# Patient Record
Sex: FEMALE | Race: WHITE | NOT HISPANIC OR LATINO | Employment: FULL TIME | URBAN - METROPOLITAN AREA
[De-identification: names, ages, dates, MRNs, and addresses within clinical notes are randomized per-mention and may not be internally consistent; named-entity substitution may affect disease eponyms.]

---

## 2018-10-26 ENCOUNTER — OFFICE VISIT (OUTPATIENT)
Dept: URGENT CARE | Facility: PHYSICIAN GROUP | Age: 48
End: 2018-10-26
Payer: COMMERCIAL

## 2018-10-26 VITALS
HEIGHT: 62 IN | RESPIRATION RATE: 18 BRPM | BODY MASS INDEX: 29.81 KG/M2 | WEIGHT: 162 LBS | TEMPERATURE: 97.6 F | DIASTOLIC BLOOD PRESSURE: 69 MMHG | SYSTOLIC BLOOD PRESSURE: 99 MMHG | OXYGEN SATURATION: 97 % | HEART RATE: 87 BPM

## 2018-10-26 DIAGNOSIS — R30.0 DYSURIA: ICD-10-CM

## 2018-10-26 DIAGNOSIS — R50.9 FEVER, UNSPECIFIED FEVER CAUSE: ICD-10-CM

## 2018-10-26 DIAGNOSIS — R50.9 CHILLS WITH FEVER: ICD-10-CM

## 2018-10-26 DIAGNOSIS — R39.9 UTI SYMPTOMS: ICD-10-CM

## 2018-10-26 LAB
APPEARANCE UR: CLEAR
BILIRUB UR STRIP-MCNC: NORMAL MG/DL
COLOR UR AUTO: NORMAL
GLUCOSE UR STRIP.AUTO-MCNC: NORMAL MG/DL
KETONES UR STRIP.AUTO-MCNC: 15 MG/DL
LEUKOCYTE ESTERASE UR QL STRIP.AUTO: NORMAL
NITRITE UR QL STRIP.AUTO: NORMAL
PH UR STRIP.AUTO: 6 [PH] (ref 5–8)
PROT UR QL STRIP: NORMAL MG/DL
RBC UR QL AUTO: NORMAL
SP GR UR STRIP.AUTO: 1.02
UROBILINOGEN UR STRIP-MCNC: 0.2 MG/DL

## 2018-10-26 PROCEDURE — 99204 OFFICE O/P NEW MOD 45 MIN: CPT | Performed by: NURSE PRACTITIONER

## 2018-10-26 PROCEDURE — 81002 URINALYSIS NONAUTO W/O SCOPE: CPT | Performed by: NURSE PRACTITIONER

## 2018-10-26 RX ORDER — TOPIRAMATE 50 MG/1
50 TABLET, FILM COATED ORAL 2 TIMES DAILY
COMMUNITY

## 2018-10-26 ASSESSMENT — ENCOUNTER SYMPTOMS
MYALGIAS: 1
BACK PAIN: 1
HEADACHES: 0
VOMITING: 1
DIZZINESS: 0
FEVER: 0
ORTHOPNEA: 0
FLANK PAIN: 0
WEAKNESS: 0
CONSTIPATION: 0
NAUSEA: 1
PALPITATIONS: 0
ABDOMINAL PAIN: 1
DIARRHEA: 0
CHILLS: 1

## 2018-10-26 NOTE — PROGRESS NOTES
"Subjective:      Zeenat Hernandez is a 48 y.o. female who presents with Pain (patient has lower back pian and abdominal pain. patient thinks she has kidney infection.)            HPI  C/o low pelvic pain, urgency/frequency, mid back pain/kidney region. Chills last night. Vomiting last night 3x. Denies hematuria or kidney stones. Ureter stent placed last year x 2 months but has been removed. Admits to have taken \"old\" prescription of Macrobid 2x, last night and today.     PMH:  has no past medical history on file.  MEDS:   Current Outpatient Prescriptions:   •  topiramate (TOPAMAX) 100 MG Tab, Take 50 mg by mouth 2 times a day., Disp: , Rfl:   ALLERGIES: No Known Allergies  SURGHX: No past surgical history on file.  SOCHX:    FH: Family history was reviewed, no pertinent findings to report    Review of Systems   Constitutional: Positive for chills. Negative for fever and malaise/fatigue.   Cardiovascular: Negative for chest pain, palpitations and orthopnea.   Gastrointestinal: Positive for abdominal pain, nausea and vomiting. Negative for constipation and diarrhea.   Genitourinary: Positive for dysuria, frequency and urgency. Negative for flank pain and hematuria.   Musculoskeletal: Positive for back pain and myalgias.   Skin: Negative for itching and rash.   Neurological: Negative for dizziness, weakness and headaches.   All other systems reviewed and are negative.         Objective:     BP (!) 99/69 (BP Location: Right arm, Patient Position: Sitting, BP Cuff Size: Adult)   Pulse 87   Temp 36.4 °C (97.6 °F) (Temporal)   Resp 18   Ht 1.575 m (5' 2\")   Wt 73.5 kg (162 lb)   SpO2 97%   BMI 29.63 kg/m²      Physical Exam   Constitutional: She is oriented to person, place, and time. She appears well-developed and well-nourished. She is active and cooperative.  Non-toxic appearance. She does not have a sickly appearance. She does not appear ill. No distress.   HENT:   Head: Normocephalic.   Eyes: Pupils are equal, " round, and reactive to light. Conjunctivae and EOM are normal.   Neck: Normal range of motion. Neck supple.   Cardiovascular: Normal rate and regular rhythm.    Pulmonary/Chest: Effort normal and breath sounds normal.   Abdominal: Soft. Bowel sounds are normal. She exhibits no distension. There is no tenderness. There is CVA tenderness. There is no rigidity, no rebound and no guarding.   Musculoskeletal: Normal range of motion.        Lumbar back: She exhibits tenderness.        Back:    Neurological: She is alert and oriented to person, place, and time.   Skin: Skin is warm and dry. She is not diaphoretic.   Vitals reviewed.              Assessment/Plan:     1. Dysuria    - POCT Urinalysis    2. Fever, unspecified fever cause      3. Chills with fever      4. UTI symptoms    - cefTRIAXone (ROCEPHIN) 1 g, lidocaine (XYLOCAINE) 1 % 3.6 mL for IM use; 1 g by Intramuscular route Once.    May finish Macrobid at this time  Increase water intake  Urinate more frequently and empty bladder completely  Practice good toileting hygiene after bowel movements and sexual intercourse, refrain from sexual intercourse until infection cleared  Monitor for increased back/flank pain, difficulty urinating, blood in urine, fever-need re-evaluation  ER precautions discussed

## 2020-09-30 ENCOUNTER — OFFICE VISIT (OUTPATIENT)
Dept: URGENT CARE | Facility: PHYSICIAN GROUP | Age: 50
End: 2020-09-30
Payer: COMMERCIAL

## 2020-09-30 ENCOUNTER — HOSPITAL ENCOUNTER (OUTPATIENT)
Facility: MEDICAL CENTER | Age: 50
End: 2020-09-30
Attending: PHYSICIAN ASSISTANT
Payer: COMMERCIAL

## 2020-09-30 VITALS
SYSTOLIC BLOOD PRESSURE: 110 MMHG | BODY MASS INDEX: 29.63 KG/M2 | OXYGEN SATURATION: 96 % | HEART RATE: 86 BPM | HEIGHT: 62 IN | RESPIRATION RATE: 18 BRPM | TEMPERATURE: 97.6 F | DIASTOLIC BLOOD PRESSURE: 70 MMHG

## 2020-09-30 DIAGNOSIS — R10.2 PELVIC PAIN: ICD-10-CM

## 2020-09-30 LAB
APPEARANCE UR: CLEAR
BILIRUB UR STRIP-MCNC: NORMAL MG/DL
COLOR UR AUTO: NORMAL
GLUCOSE UR STRIP.AUTO-MCNC: NORMAL MG/DL
KETONES UR STRIP.AUTO-MCNC: NORMAL MG/DL
LEUKOCYTE ESTERASE UR QL STRIP.AUTO: NORMAL
NITRITE UR QL STRIP.AUTO: NORMAL
PH UR STRIP.AUTO: 5.5 [PH] (ref 5–8)
PROT UR QL STRIP: NORMAL MG/DL
RBC UR QL AUTO: NORMAL
SP GR UR STRIP.AUTO: 1.03
UROBILINOGEN UR STRIP-MCNC: 0.2 MG/DL

## 2020-09-30 PROCEDURE — 81002 URINALYSIS NONAUTO W/O SCOPE: CPT | Performed by: PHYSICIAN ASSISTANT

## 2020-09-30 PROCEDURE — 87086 URINE CULTURE/COLONY COUNT: CPT

## 2020-09-30 PROCEDURE — 99214 OFFICE O/P EST MOD 30 MIN: CPT | Performed by: PHYSICIAN ASSISTANT

## 2020-09-30 RX ORDER — MELOXICAM 15 MG/1
TABLET ORAL DAILY
COMMUNITY
Start: 2020-09-01 | End: 2022-06-17

## 2020-09-30 RX ORDER — METHYLPREDNISOLONE 4 MG/1
TABLET ORAL
COMMUNITY
Start: 2020-08-18 | End: 2022-06-17

## 2020-09-30 ASSESSMENT — ENCOUNTER SYMPTOMS
EYE PAIN: 0
MYALGIAS: 0
SHORTNESS OF BREATH: 0
SORE THROAT: 0
FEVER: 0
ABDOMINAL PAIN: 1
NAUSEA: 0
VOMITING: 0
CHILLS: 0
COUGH: 0
BLOOD IN STOOL: 0
CONSTIPATION: 0
HEADACHES: 0
DIARRHEA: 0

## 2020-09-30 NOTE — PROGRESS NOTES
Subjective:   Zeenat Hernandez is a 50 y.o. female who presents for Abdominal Pain (when pushing on it it hurts or when reaching down it stabbing pain x 1 week )      This is a 50-year-old female who presents complaining of bilateral pelvic pain left greater than right which she first noticed around 1 week ago directly after she reached down aggressively to pull up her pants.  She is never experienced this problem before and reports that the pain is somewhat cramping and intermittent.  Happens mostly when she is changing positions but occasionally it will happen when it is not triggered by movement.  She has had no bladder or bowel issues and denies any dysuria, frequency or urgency.  She denies any vaginal bleeding or vaginal discharge.  She has history of a hysterectomy and unilateral oophorectomy but does not remember which side she still has an ovary.  Also of concern is that she has gained around 50 pounds over the last 3 months she reports.  She is been struggling to get into a primary care provider.  She is not noticed any fevers or chills, body aches or malaise.      Review of Systems   Constitutional: Negative for chills, fever and malaise/fatigue.   HENT: Negative for congestion, ear pain and sore throat.    Eyes: Negative for pain.   Respiratory: Negative for cough and shortness of breath.    Cardiovascular: Negative for chest pain.   Gastrointestinal: Positive for abdominal pain. Negative for blood in stool, constipation, diarrhea, nausea and vomiting.   Genitourinary: Negative for dysuria.   Musculoskeletal: Negative for myalgias.   Skin: Negative for rash.   Neurological: Negative for headaches.       Medications:    • meloxicam  • methylPREDNISolone Tbpk  • topiramate Tabs    Allergies: Patient has no known allergies.    Problem List: Zeenat Hernandez does not have a problem list on file.    Surgical History:  No past surgical history on file.    Past Social Hx: Zeenat Hernandez  reports that she has never  "smoked. She has never used smokeless tobacco.     Past Family Hx:  Zeenat Hernandez family history is not on file.     Problem list, medications, and allergies reviewed by myself today in Epic.     Objective:     /70 (BP Location: Right arm, Patient Position: Sitting, BP Cuff Size: Adult long)   Pulse 86   Temp 36.4 °C (97.6 °F) (Tympanic)   Resp 18   Ht 1.575 m (5' 2\")   SpO2 96%   Breastfeeding No   BMI 29.63 kg/m²     Physical Exam  Vitals signs reviewed.   Constitutional:       Appearance: Normal appearance. She is not ill-appearing or toxic-appearing.   HENT:      Head: Normocephalic and atraumatic.      Right Ear: External ear normal.      Left Ear: External ear normal.      Nose: Nose normal.      Mouth/Throat:      Mouth: Mucous membranes are moist.   Eyes:      Conjunctiva/sclera: Conjunctivae normal.   Cardiovascular:      Rate and Rhythm: Normal rate.   Pulmonary:      Effort: Pulmonary effort is normal.   Abdominal:      Comments: Nontender abdomen to light and deep palpation.  I can probe deeply at the area indicated at the left suprapubic area and there is no abdominal wall defect.  Rebound or guarding.  Significant adiposity.   Skin:     General: Skin is warm and dry.      Capillary Refill: Capillary refill takes less than 2 seconds.   Neurological:      Mental Status: She is alert and oriented to person, place, and time.         Lab Results/POC Test Results   Results for orders placed or performed in visit on 09/30/20   POCT Urinalysis   Result Value Ref Range    POC Color DARK YELLOW Negative    POC Appearance CLEAR Negative    POC Leukocyte Esterase NEG Negative    POC Nitrites NEG Negative    POC Urobiligen 0.2 Negative (0.2) mg/dL    POC Protein NEG Negative mg/dL    POC Urine PH 5.5 5.0 - 8.0    POC Blood NEG Negative    POC Specific Gravity 1.030 <1.005 - >1.030    POC Ketones NEG Negative mg/dL    POC Bilirubin NEG Negative mg/dL    POC Glucose NEG Negative mg/dL     "       Assessment/Plan:     Diagnosis and associated orders:     1. Pelvic pain  POCT Urinalysis    URINE CULTURE(NEW)    REFERRAL TO FOLLOW-UP WITH PRIMARY CARE    US-PELVIC COMPLETE (TRANSABDOMINAL/TRANSVAGINAL) (COMBO)      Comments/MDM:     • Patient's urinalysis does not support a urinary tract infection as well as her symptomatology.  I discussed with her on think it is wise to do empiric treatment.  I am happy to send it out for culture and follow-up with her via my chart or phone call.  The area she indicates is more suprapubic and with her unknown unilateral oophorectomy and not sure if she has an ovary there or if this is an underlying bowel pathology.  She has a stated history of celiac disease but has an inexplicable 50 pound weight gain in 3 months which is concerning for malignancy or other pathology.-Referral to primary care.  • To evaluate for suprapubic pain I put in an order for transvaginal transpelvic ultrasound but due to scheduling issues she is unable to make this.  I provided her with the order and the scheduling information and states that she can get it done anytime in the next 5 days before the order expires and I will follow-up with her.  I suggested ER precautions including any bladder or bowel abnormalities, worsening pain, or any new concerning symptoms that need evaluation.  She demonstrated verbal understanding and was appreciative of care.  There is no red flag signs that this is a life-threatening etiology right now.  She has stable vitals, nontender abdomen and reports that today her pain is actually much better than yesterday           Differential diagnosis, natural history, supportive care, and indications for immediate follow-up discussed.    Advised the patient to follow-up with the primary care physician for recheck, reevaluation, and consideration of further management.    Please note that this dictation was created using voice recognition software. I have made a reasonable  attempt to correct obvious errors, but I expect that there are errors of grammar and possibly content that I did not discover before finalizing the note.    This note was electronically signed by Philip Muñoz PA-C

## 2020-10-03 LAB
BACTERIA UR CULT: NORMAL
SIGNIFICANT IND 70042: NORMAL
SITE SITE: NORMAL
SOURCE SOURCE: NORMAL

## 2022-06-17 ENCOUNTER — PRE-ADMISSION TESTING (OUTPATIENT)
Dept: ADMISSIONS | Facility: MEDICAL CENTER | Age: 52
DRG: 655 | End: 2022-06-17
Attending: UROLOGY
Payer: COMMERCIAL

## 2022-06-21 ENCOUNTER — ANESTHESIA (OUTPATIENT)
Dept: SURGERY | Facility: MEDICAL CENTER | Age: 52
DRG: 655 | End: 2022-06-21
Payer: COMMERCIAL

## 2022-06-21 ENCOUNTER — HOSPITAL ENCOUNTER (INPATIENT)
Facility: MEDICAL CENTER | Age: 52
LOS: 1 days | DRG: 655 | End: 2022-06-22
Attending: UROLOGY | Admitting: UROLOGY
Payer: COMMERCIAL

## 2022-06-21 ENCOUNTER — ANESTHESIA EVENT (OUTPATIENT)
Dept: SURGERY | Facility: MEDICAL CENTER | Age: 52
DRG: 655 | End: 2022-06-21
Payer: COMMERCIAL

## 2022-06-21 DIAGNOSIS — N13.5 URETERAL STRICTURE: ICD-10-CM

## 2022-06-21 DIAGNOSIS — N13.5 STRICTURE OR KINKING OF URETER: Primary | ICD-10-CM

## 2022-06-21 PROCEDURE — 0T9640Z DRAINAGE OF RIGHT URETER WITH DRAINAGE DEVICE, PERCUTANEOUS ENDOSCOPIC APPROACH: ICD-10-PCS | Performed by: UROLOGY

## 2022-06-21 PROCEDURE — 0TSB4ZZ REPOSITION BLADDER, PERCUTANEOUS ENDOSCOPIC APPROACH: ICD-10-PCS | Performed by: UROLOGY

## 2022-06-21 PROCEDURE — 88305 TISSUE EXAM BY PATHOLOGIST: CPT

## 2022-06-21 PROCEDURE — 700101 HCHG RX REV CODE 250: Performed by: ANESTHESIOLOGY

## 2022-06-21 PROCEDURE — 8E0W4CZ ROBOTIC ASSISTED PROCEDURE OF TRUNK REGION, PERCUTANEOUS ENDOSCOPIC APPROACH: ICD-10-PCS | Performed by: UROLOGY

## 2022-06-21 PROCEDURE — 700104 HCHG RX REV CODE 254: Performed by: ANESTHESIOLOGY

## 2022-06-21 PROCEDURE — 160009 HCHG ANES TIME/MIN: Performed by: UROLOGY

## 2022-06-21 PROCEDURE — 0TS74ZZ REPOSITION LEFT URETER, PERCUTANEOUS ENDOSCOPIC APPROACH: ICD-10-PCS | Performed by: UROLOGY

## 2022-06-21 PROCEDURE — C2617 STENT, NON-COR, TEM W/O DEL: HCPCS | Performed by: UROLOGY

## 2022-06-21 PROCEDURE — 700105 HCHG RX REV CODE 258: Performed by: UROLOGY

## 2022-06-21 PROCEDURE — 770001 HCHG ROOM/CARE - MED/SURG/GYN PRIV*

## 2022-06-21 PROCEDURE — 160035 HCHG PACU - 1ST 60 MINS PHASE I: Performed by: UROLOGY

## 2022-06-21 PROCEDURE — 502714 HCHG ROBOTIC SURGERY SERVICES: Performed by: UROLOGY

## 2022-06-21 PROCEDURE — 0T778DZ DILATION OF LEFT URETER WITH INTRALUMINAL DEVICE, VIA NATURAL OR ARTIFICIAL OPENING ENDOSCOPIC: ICD-10-PCS | Performed by: UROLOGY

## 2022-06-21 PROCEDURE — 700111 HCHG RX REV CODE 636 W/ 250 OVERRIDE (IP): Performed by: UROLOGY

## 2022-06-21 PROCEDURE — 700102 HCHG RX REV CODE 250 W/ 637 OVERRIDE(OP): Performed by: ANESTHESIOLOGY

## 2022-06-21 PROCEDURE — 700111 HCHG RX REV CODE 636 W/ 250 OVERRIDE (IP): Performed by: ANESTHESIOLOGY

## 2022-06-21 PROCEDURE — 160002 HCHG RECOVERY MINUTES (STAT): Performed by: UROLOGY

## 2022-06-21 PROCEDURE — 160042 HCHG SURGERY MINUTES - EA ADDL 1 MIN LEVEL 5: Performed by: UROLOGY

## 2022-06-21 PROCEDURE — 700101 HCHG RX REV CODE 250: Performed by: UROLOGY

## 2022-06-21 PROCEDURE — 700102 HCHG RX REV CODE 250 W/ 637 OVERRIDE(OP): Performed by: UROLOGY

## 2022-06-21 PROCEDURE — RXMED WILLOW AMBULATORY MEDICATION CHARGE: Performed by: UROLOGY

## 2022-06-21 PROCEDURE — 00862 ANES XTRPRTL LWR ABD RNL PX: CPT | Performed by: ANESTHESIOLOGY

## 2022-06-21 PROCEDURE — 160048 HCHG OR STATISTICAL LEVEL 1-5: Performed by: UROLOGY

## 2022-06-21 PROCEDURE — A9270 NON-COVERED ITEM OR SERVICE: HCPCS | Performed by: UROLOGY

## 2022-06-21 PROCEDURE — 110371 HCHG SHELL REV 272: Performed by: UROLOGY

## 2022-06-21 PROCEDURE — A9270 NON-COVERED ITEM OR SERVICE: HCPCS | Performed by: ANESTHESIOLOGY

## 2022-06-21 PROCEDURE — C1769 GUIDE WIRE: HCPCS | Performed by: UROLOGY

## 2022-06-21 PROCEDURE — 0T9B70Z DRAINAGE OF BLADDER WITH DRAINAGE DEVICE, VIA NATURAL OR ARTIFICIAL OPENING: ICD-10-PCS | Performed by: UROLOGY

## 2022-06-21 PROCEDURE — 0TP5X0Z REMOVAL OF DRAINAGE DEVICE FROM KIDNEY, EXTERNAL APPROACH: ICD-10-PCS | Performed by: UROLOGY

## 2022-06-21 PROCEDURE — 160031 HCHG SURGERY MINUTES - 1ST 30 MINS LEVEL 5: Performed by: UROLOGY

## 2022-06-21 DEVICE — STENT UROLOGICAL POLARIS 6X26  ULTRA: Type: IMPLANTABLE DEVICE | Site: URETER | Status: FUNCTIONAL

## 2022-06-21 RX ORDER — ONDANSETRON 2 MG/ML
4 INJECTION INTRAMUSCULAR; INTRAVENOUS EVERY 4 HOURS PRN
Status: DISCONTINUED | OUTPATIENT
Start: 2022-06-21 | End: 2022-06-22 | Stop reason: HOSPADM

## 2022-06-21 RX ORDER — INDOCYANINE GREEN AND WATER 25 MG
KIT INJECTION PRN
Status: DISCONTINUED | OUTPATIENT
Start: 2022-06-21 | End: 2022-06-21 | Stop reason: SURG

## 2022-06-21 RX ORDER — FLUCONAZOLE 2 MG/ML
400 INJECTION, SOLUTION INTRAVENOUS ONCE
Status: COMPLETED | OUTPATIENT
Start: 2022-06-21 | End: 2022-06-21

## 2022-06-21 RX ORDER — SCOLOPAMINE TRANSDERMAL SYSTEM 1 MG/1
1 PATCH, EXTENDED RELEASE TRANSDERMAL
Status: DISCONTINUED | OUTPATIENT
Start: 2022-06-21 | End: 2022-06-22 | Stop reason: HOSPADM

## 2022-06-21 RX ORDER — OXYCODONE HYDROCHLORIDE 5 MG/1
5 TABLET ORAL EVERY 4 HOURS PRN
Qty: 15 TABLET | Refills: 0 | Status: SHIPPED | OUTPATIENT
Start: 2022-06-21 | End: 2022-06-27

## 2022-06-21 RX ORDER — HALOPERIDOL 5 MG/ML
1 INJECTION INTRAMUSCULAR
Status: DISCONTINUED | OUTPATIENT
Start: 2022-06-21 | End: 2022-06-21 | Stop reason: HOSPADM

## 2022-06-21 RX ORDER — ATROPA BELLADONNA AND OPIUM 16.2; 6 MG/1; MG/1
1 SUPPOSITORY RECTAL
Status: COMPLETED | OUTPATIENT
Start: 2022-06-21 | End: 2022-06-21

## 2022-06-21 RX ORDER — METOPROLOL TARTRATE 1 MG/ML
1 INJECTION, SOLUTION INTRAVENOUS
Status: DISCONTINUED | OUTPATIENT
Start: 2022-06-21 | End: 2022-06-21 | Stop reason: HOSPADM

## 2022-06-21 RX ORDER — KETOROLAC TROMETHAMINE 30 MG/ML
15 INJECTION, SOLUTION INTRAMUSCULAR; INTRAVENOUS EVERY 6 HOURS
Status: DISCONTINUED | OUTPATIENT
Start: 2022-06-21 | End: 2022-06-22 | Stop reason: HOSPADM

## 2022-06-21 RX ORDER — IBUPROFEN 800 MG/1
800 TABLET ORAL 3 TIMES DAILY PRN
Status: DISCONTINUED | OUTPATIENT
Start: 2022-06-24 | End: 2022-06-22 | Stop reason: HOSPADM

## 2022-06-21 RX ORDER — SODIUM CHLORIDE, SODIUM LACTATE, POTASSIUM CHLORIDE, CALCIUM CHLORIDE 600; 310; 30; 20 MG/100ML; MG/100ML; MG/100ML; MG/100ML
INJECTION, SOLUTION INTRAVENOUS CONTINUOUS
Status: ACTIVE | OUTPATIENT
Start: 2022-06-21 | End: 2022-06-21

## 2022-06-21 RX ORDER — MIDAZOLAM HYDROCHLORIDE 1 MG/ML
1 INJECTION INTRAMUSCULAR; INTRAVENOUS
Status: DISCONTINUED | OUTPATIENT
Start: 2022-06-21 | End: 2022-06-21 | Stop reason: HOSPADM

## 2022-06-21 RX ORDER — HYDROXYZINE HYDROCHLORIDE 25 MG/1
25 TABLET, FILM COATED ORAL NIGHTLY PRN
Status: DISCONTINUED | OUTPATIENT
Start: 2022-06-21 | End: 2022-06-22 | Stop reason: HOSPADM

## 2022-06-21 RX ORDER — HYDRALAZINE HYDROCHLORIDE 20 MG/ML
5 INJECTION INTRAMUSCULAR; INTRAVENOUS
Status: DISCONTINUED | OUTPATIENT
Start: 2022-06-21 | End: 2022-06-21 | Stop reason: HOSPADM

## 2022-06-21 RX ORDER — ACETAMINOPHEN 500 MG
1000 TABLET ORAL EVERY 6 HOURS
Status: DISCONTINUED | OUTPATIENT
Start: 2022-06-21 | End: 2022-06-22 | Stop reason: HOSPADM

## 2022-06-21 RX ORDER — OXYCODONE HYDROCHLORIDE 5 MG/1
5 TABLET ORAL
Status: DISCONTINUED | OUTPATIENT
Start: 2022-06-21 | End: 2022-06-22 | Stop reason: HOSPADM

## 2022-06-21 RX ORDER — GENTAMICIN SULFATE 40 MG/ML
INJECTION, SOLUTION INTRAMUSCULAR; INTRAVENOUS PRN
Status: DISCONTINUED | OUTPATIENT
Start: 2022-06-21 | End: 2022-06-21 | Stop reason: SURG

## 2022-06-21 RX ORDER — HYDROMORPHONE HYDROCHLORIDE 1 MG/ML
0.5 INJECTION, SOLUTION INTRAMUSCULAR; INTRAVENOUS; SUBCUTANEOUS
Status: DISCONTINUED | OUTPATIENT
Start: 2022-06-21 | End: 2022-06-22 | Stop reason: HOSPADM

## 2022-06-21 RX ORDER — SODIUM CHLORIDE, SODIUM LACTATE, POTASSIUM CHLORIDE, CALCIUM CHLORIDE 600; 310; 30; 20 MG/100ML; MG/100ML; MG/100ML; MG/100ML
INJECTION, SOLUTION INTRAVENOUS CONTINUOUS
Status: ACTIVE | OUTPATIENT
Start: 2022-06-21 | End: 2022-06-22

## 2022-06-21 RX ORDER — ROCURONIUM BROMIDE 10 MG/ML
INJECTION, SOLUTION INTRAVENOUS PRN
Status: DISCONTINUED | OUTPATIENT
Start: 2022-06-21 | End: 2022-06-21 | Stop reason: SURG

## 2022-06-21 RX ORDER — IPRATROPIUM BROMIDE AND ALBUTEROL SULFATE 2.5; .5 MG/3ML; MG/3ML
3 SOLUTION RESPIRATORY (INHALATION)
Status: DISCONTINUED | OUTPATIENT
Start: 2022-06-21 | End: 2022-06-21 | Stop reason: HOSPADM

## 2022-06-21 RX ORDER — OXYCODONE HYDROCHLORIDE AND ACETAMINOPHEN 5; 325 MG/1; MG/1
2 TABLET ORAL
Status: COMPLETED | OUTPATIENT
Start: 2022-06-21 | End: 2022-06-21

## 2022-06-21 RX ORDER — CEFAZOLIN SODIUM 1 G/3ML
INJECTION, POWDER, FOR SOLUTION INTRAMUSCULAR; INTRAVENOUS PRN
Status: DISCONTINUED | OUTPATIENT
Start: 2022-06-21 | End: 2022-06-21 | Stop reason: SURG

## 2022-06-21 RX ORDER — DIPHENHYDRAMINE HYDROCHLORIDE 50 MG/ML
12.5 INJECTION INTRAMUSCULAR; INTRAVENOUS
Status: DISCONTINUED | OUTPATIENT
Start: 2022-06-21 | End: 2022-06-21 | Stop reason: HOSPADM

## 2022-06-21 RX ORDER — BUPIVACAINE HYDROCHLORIDE AND EPINEPHRINE 5; 5 MG/ML; UG/ML
INJECTION, SOLUTION EPIDURAL; INTRACAUDAL; PERINEURAL
Status: DISCONTINUED | OUTPATIENT
Start: 2022-06-21 | End: 2022-06-21 | Stop reason: HOSPADM

## 2022-06-21 RX ORDER — METOPROLOL TARTRATE 1 MG/ML
INJECTION, SOLUTION INTRAVENOUS PRN
Status: DISCONTINUED | OUTPATIENT
Start: 2022-06-21 | End: 2022-06-21 | Stop reason: SURG

## 2022-06-21 RX ORDER — OXYCODONE HYDROCHLORIDE AND ACETAMINOPHEN 5; 325 MG/1; MG/1
1 TABLET ORAL
Status: COMPLETED | OUTPATIENT
Start: 2022-06-21 | End: 2022-06-21

## 2022-06-21 RX ORDER — OXYBUTYNIN CHLORIDE 5 MG/1
10 TABLET, EXTENDED RELEASE ORAL EVERY EVENING
Status: DISCONTINUED | OUTPATIENT
Start: 2022-06-21 | End: 2022-06-22 | Stop reason: HOSPADM

## 2022-06-21 RX ORDER — ONDANSETRON 2 MG/ML
INJECTION INTRAMUSCULAR; INTRAVENOUS PRN
Status: DISCONTINUED | OUTPATIENT
Start: 2022-06-21 | End: 2022-06-21 | Stop reason: SURG

## 2022-06-21 RX ORDER — HYDROMORPHONE HYDROCHLORIDE 1 MG/ML
0.2 INJECTION, SOLUTION INTRAMUSCULAR; INTRAVENOUS; SUBCUTANEOUS
Status: DISCONTINUED | OUTPATIENT
Start: 2022-06-21 | End: 2022-06-21 | Stop reason: HOSPADM

## 2022-06-21 RX ORDER — HYDROMORPHONE HYDROCHLORIDE 1 MG/ML
0.1 INJECTION, SOLUTION INTRAMUSCULAR; INTRAVENOUS; SUBCUTANEOUS
Status: DISCONTINUED | OUTPATIENT
Start: 2022-06-21 | End: 2022-06-21 | Stop reason: HOSPADM

## 2022-06-21 RX ORDER — POLYETHYLENE GLYCOL 3350 17 G/17G
1 POWDER, FOR SOLUTION ORAL DAILY
Status: DISCONTINUED | OUTPATIENT
Start: 2022-06-22 | End: 2022-06-22 | Stop reason: HOSPADM

## 2022-06-21 RX ORDER — DEXAMETHASONE SODIUM PHOSPHATE 4 MG/ML
INJECTION, SOLUTION INTRA-ARTICULAR; INTRALESIONAL; INTRAMUSCULAR; INTRAVENOUS; SOFT TISSUE PRN
Status: DISCONTINUED | OUTPATIENT
Start: 2022-06-21 | End: 2022-06-21 | Stop reason: SURG

## 2022-06-21 RX ORDER — MEPERIDINE HYDROCHLORIDE 25 MG/ML
12.5 INJECTION INTRAMUSCULAR; INTRAVENOUS; SUBCUTANEOUS
Status: DISCONTINUED | OUTPATIENT
Start: 2022-06-21 | End: 2022-06-21 | Stop reason: HOSPADM

## 2022-06-21 RX ORDER — HEPARIN SODIUM 5000 [USP'U]/ML
5000 INJECTION, SOLUTION INTRAVENOUS; SUBCUTANEOUS EVERY 8 HOURS
Status: DISCONTINUED | OUTPATIENT
Start: 2022-06-22 | End: 2022-06-22 | Stop reason: HOSPADM

## 2022-06-21 RX ORDER — OXYCODONE HYDROCHLORIDE 10 MG/1
10 TABLET ORAL
Status: DISCONTINUED | OUTPATIENT
Start: 2022-06-21 | End: 2022-06-22 | Stop reason: HOSPADM

## 2022-06-21 RX ORDER — HYDROMORPHONE HYDROCHLORIDE 1 MG/ML
0.4 INJECTION, SOLUTION INTRAMUSCULAR; INTRAVENOUS; SUBCUTANEOUS
Status: DISCONTINUED | OUTPATIENT
Start: 2022-06-21 | End: 2022-06-21 | Stop reason: HOSPADM

## 2022-06-21 RX ORDER — NITROFURANTOIN 25; 75 MG/1; MG/1
100 CAPSULE ORAL DAILY
Qty: 10 CAPSULE | Refills: 0 | Status: SHIPPED | OUTPATIENT
Start: 2022-06-21

## 2022-06-21 RX ORDER — ACETAMINOPHEN 500 MG
1000 TABLET ORAL EVERY 6 HOURS PRN
Status: DISCONTINUED | OUTPATIENT
Start: 2022-06-26 | End: 2022-06-22 | Stop reason: HOSPADM

## 2022-06-21 RX ORDER — MORPHINE SULFATE 0.5 MG/ML
INJECTION, SOLUTION EPIDURAL; INTRATHECAL; INTRAVENOUS PRN
Status: DISCONTINUED | OUTPATIENT
Start: 2022-06-21 | End: 2022-06-21 | Stop reason: SURG

## 2022-06-21 RX ORDER — TOPIRAMATE 25 MG/1
50 TABLET ORAL DAILY
Status: DISCONTINUED | OUTPATIENT
Start: 2022-06-22 | End: 2022-06-22 | Stop reason: HOSPADM

## 2022-06-21 RX ORDER — ONDANSETRON 2 MG/ML
4 INJECTION INTRAMUSCULAR; INTRAVENOUS ONCE
Status: DISCONTINUED | OUTPATIENT
Start: 2022-06-21 | End: 2022-06-21 | Stop reason: HOSPADM

## 2022-06-21 RX ADMIN — ATROPA BELLADONNA AND OPIUM 60 MG: 16.2; 6 SUPPOSITORY RECTAL at 17:58

## 2022-06-21 RX ADMIN — MIDAZOLAM 2 MG: 1 INJECTION INTRAMUSCULAR; INTRAVENOUS at 14:52

## 2022-06-21 RX ADMIN — SUGAMMADEX 200 MG: 100 INJECTION, SOLUTION INTRAVENOUS at 17:16

## 2022-06-21 RX ADMIN — OXYCODONE 5 MG: 5 TABLET ORAL at 20:19

## 2022-06-21 RX ADMIN — CEFAZOLIN 2 G: 330 INJECTION, POWDER, FOR SOLUTION INTRAMUSCULAR; INTRAVENOUS at 15:05

## 2022-06-21 RX ADMIN — FENTANYL CITRATE 100 MCG: 50 INJECTION, SOLUTION INTRAMUSCULAR; INTRAVENOUS at 14:52

## 2022-06-21 RX ADMIN — FLUCONAZOLE 400 MG: 2 INJECTION, SOLUTION INTRAVENOUS at 15:26

## 2022-06-21 RX ADMIN — DEXAMETHASONE SODIUM PHOSPHATE 8 MG: 4 INJECTION, SOLUTION INTRA-ARTICULAR; INTRALESIONAL; INTRAMUSCULAR; INTRAVENOUS; SOFT TISSUE at 15:16

## 2022-06-21 RX ADMIN — ROCURONIUM BROMIDE 20 MG: 10 INJECTION, SOLUTION INTRAVENOUS at 16:21

## 2022-06-21 RX ADMIN — ROCURONIUM BROMIDE 50 MG: 10 INJECTION, SOLUTION INTRAVENOUS at 14:58

## 2022-06-21 RX ADMIN — METOPROLOL TARTRATE 2.5 MG: 5 INJECTION, SOLUTION INTRAVENOUS at 15:54

## 2022-06-21 RX ADMIN — SODIUM CHLORIDE, POTASSIUM CHLORIDE, SODIUM LACTATE AND CALCIUM CHLORIDE: 600; 310; 30; 20 INJECTION, SOLUTION INTRAVENOUS at 20:19

## 2022-06-21 RX ADMIN — OXYCODONE HYDROCHLORIDE AND ACETAMINOPHEN 2 TABLET: 5; 325 TABLET ORAL at 17:44

## 2022-06-21 RX ADMIN — PROPOFOL 200 MG: 10 INJECTION, EMULSION INTRAVENOUS at 14:58

## 2022-06-21 RX ADMIN — FENTANYL CITRATE 50 MCG: 50 INJECTION, SOLUTION INTRAMUSCULAR; INTRAVENOUS at 18:04

## 2022-06-21 RX ADMIN — GENTAMICIN SULFATE 240 MG: 40 INJECTION, SOLUTION INTRAMUSCULAR; INTRAVENOUS at 15:11

## 2022-06-21 RX ADMIN — MORPHINE SULFATE 5 MG: 0.5 INJECTION EPIDURAL; INTRATHECAL; INTRAVENOUS at 15:24

## 2022-06-21 RX ADMIN — KETOROLAC TROMETHAMINE 15 MG: 30 INJECTION, SOLUTION INTRAMUSCULAR; INTRAVENOUS at 21:55

## 2022-06-21 RX ADMIN — FENTANYL CITRATE 25 MCG: 50 INJECTION, SOLUTION INTRAMUSCULAR; INTRAVENOUS at 17:42

## 2022-06-21 RX ADMIN — ONDANSETRON 4 MG: 2 INJECTION INTRAMUSCULAR; INTRAVENOUS at 17:14

## 2022-06-21 RX ADMIN — SODIUM CHLORIDE, POTASSIUM CHLORIDE, SODIUM LACTATE AND CALCIUM CHLORIDE: 600; 310; 30; 20 INJECTION, SOLUTION INTRAVENOUS at 14:48

## 2022-06-21 RX ADMIN — INDOCYANINE GREEN AND WATER 5 MG: KIT at 16:12

## 2022-06-21 RX ADMIN — ACETAMINOPHEN 1000 MG: 500 TABLET, FILM COATED ORAL at 20:18

## 2022-06-21 RX ADMIN — FENTANYL CITRATE 25 MCG: 50 INJECTION, SOLUTION INTRAMUSCULAR; INTRAVENOUS at 18:14

## 2022-06-21 ASSESSMENT — COGNITIVE AND FUNCTIONAL STATUS - GENERAL
SUGGESTED CMS G CODE MODIFIER DAILY ACTIVITY: CK
MOVING FROM LYING ON BACK TO SITTING ON SIDE OF FLAT BED: A LOT
SUGGESTED CMS G CODE MODIFIER MOBILITY: CK
DRESSING REGULAR LOWER BODY CLOTHING: A LITTLE
WALKING IN HOSPITAL ROOM: A LITTLE
DAILY ACTIVITIY SCORE: 17
HELP NEEDED FOR BATHING: A LITTLE
TURNING FROM BACK TO SIDE WHILE IN FLAT BAD: A LITTLE
MOBILITY SCORE: 16
TOILETING: A LITTLE
PERSONAL GROOMING: A LITTLE
CLIMB 3 TO 5 STEPS WITH RAILING: A LITTLE
DRESSING REGULAR UPPER BODY CLOTHING: A LOT
MOVING TO AND FROM BED TO CHAIR: A LITTLE
EATING MEALS: A LITTLE
STANDING UP FROM CHAIR USING ARMS: A LOT

## 2022-06-21 ASSESSMENT — PAIN DESCRIPTION - PAIN TYPE
TYPE: SURGICAL PAIN
TYPE: ACUTE PAIN;SURGICAL PAIN
TYPE: SURGICAL PAIN
TYPE: ACUTE PAIN;SURGICAL PAIN
TYPE: ACUTE PAIN;SURGICAL PAIN
TYPE: SURGICAL PAIN
TYPE: SURGICAL PAIN

## 2022-06-21 ASSESSMENT — LIFESTYLE VARIABLES
TOTAL SCORE: 0
EVER FELT BAD OR GUILTY ABOUT YOUR DRINKING: NO
AVERAGE NUMBER OF DAYS PER WEEK YOU HAVE A DRINK CONTAINING ALCOHOL: 0
ALCOHOL_USE: NO
ON A TYPICAL DAY WHEN YOU DRINK ALCOHOL HOW MANY DRINKS DO YOU HAVE: 0
HAVE PEOPLE ANNOYED YOU BY CRITICIZING YOUR DRINKING: NO
CONSUMPTION TOTAL: NEGATIVE
HAVE YOU EVER FELT YOU SHOULD CUT DOWN ON YOUR DRINKING: NO
HOW MANY TIMES IN THE PAST YEAR HAVE YOU HAD 5 OR MORE DRINKS IN A DAY: 0
TOTAL SCORE: 0
TOTAL SCORE: 0
EVER HAD A DRINK FIRST THING IN THE MORNING TO STEADY YOUR NERVES TO GET RID OF A HANGOVER: NO
DOES PATIENT WANT TO STOP DRINKING: NO

## 2022-06-21 ASSESSMENT — PATIENT HEALTH QUESTIONNAIRE - PHQ9
1. LITTLE INTEREST OR PLEASURE IN DOING THINGS: NOT AT ALL
2. FEELING DOWN, DEPRESSED, IRRITABLE, OR HOPELESS: NOT AT ALL
SUM OF ALL RESPONSES TO PHQ9 QUESTIONS 1 AND 2: 0

## 2022-06-21 ASSESSMENT — PAIN SCALES - GENERAL: PAIN_LEVEL: 0

## 2022-06-21 NOTE — ANESTHESIA PREPROCEDURE EVALUATION
Case: [id number] Date/Time: 06/21/22 1345    Procedure: LEFT URETERAL REIMPLANT, ROBOT ASSISTED LAPAROSCOPIC (Left )    Pre-op diagnosis: STRICTURE OF URETER    Location: [facility] OR 15 / SURGERY [facility]    Surgeons: Vasu Escalante M.D.          Relevant Problems   No relevant active problems       Physical Exam    Airway   Mallampati: II  TM distance: >3 FB  Neck ROM: full       Cardiovascular - normal exam  Rhythm: regular  Rate: normal  (-) murmur     Dental - normal exam           Pulmonary - normal exam  Breath sounds clear to auscultation     Abdominal    Neurological - normal exam                 Anesthesia Plan    ASA 2       Plan - general       Airway plan will be ETT          Induction: intravenous    Postoperative Plan: Postoperative administration of opioids is intended.    Pertinent diagnostic labs and testing reviewed    Informed Consent:    Anesthetic plan and risks discussed with patient.    Use of blood products discussed with: patient whom consented to blood products.

## 2022-06-21 NOTE — ANESTHESIA PROCEDURE NOTES
Airway    Date/Time: 6/21/2022 2:58 PM  Performed by: Lang Davison M.D.  Authorized by: Lang Davison M.D.     Location:  OR  Urgency:  Elective  Difficult Airway: No    Indications for Airway Management:  Anesthesia      Spontaneous Ventilation: absent    Sedation Level:  Deep  Preoxygenated: Yes    Patient Position:  Sniffing  Mask Difficulty Assessment:  1 - vent by mask  Final Airway Type:  Endotracheal airway  Final Endotracheal Airway:  ETT  Cuffed: Yes    Technique Used for Successful ETT Placement:  Direct laryngoscopy    Insertion Site:  Oral  Blade Type:  Neri  Laryngoscope Blade/Videolaryngoscope Blade Size:  2  ETT Size (mm):  7.5  Measured from:  Teeth  ETT to Teeth (cm):  22  Placement Verified by: auscultation and capnometry    Cormack-Lehane Classification:  Grade I - full view of glottis  Number of Attempts at Approach:  1

## 2022-06-22 ENCOUNTER — PHARMACY VISIT (OUTPATIENT)
Dept: PHARMACY | Facility: MEDICAL CENTER | Age: 52
End: 2022-06-22
Payer: COMMERCIAL

## 2022-06-22 VITALS
DIASTOLIC BLOOD PRESSURE: 54 MMHG | WEIGHT: 178.35 LBS | TEMPERATURE: 98.7 F | HEART RATE: 71 BPM | SYSTOLIC BLOOD PRESSURE: 97 MMHG | OXYGEN SATURATION: 91 % | RESPIRATION RATE: 16 BRPM | BODY MASS INDEX: 31.6 KG/M2 | HEIGHT: 63 IN

## 2022-06-22 LAB
ANION GAP SERPL CALC-SCNC: 8 MMOL/L (ref 7–16)
BUN SERPL-MCNC: 17 MG/DL (ref 8–22)
CALCIUM SERPL-MCNC: 9.1 MG/DL (ref 8.5–10.5)
CHLORIDE SERPL-SCNC: 106 MMOL/L (ref 96–112)
CO2 SERPL-SCNC: 23 MMOL/L (ref 20–33)
CREAT SERPL-MCNC: 1.1 MG/DL (ref 0.5–1.4)
ERYTHROCYTE [DISTWIDTH] IN BLOOD BY AUTOMATED COUNT: 46 FL (ref 35.9–50)
GFR SERPLBLD CREATININE-BSD FMLA CKD-EPI: 61 ML/MIN/1.73 M 2
GLUCOSE SERPL-MCNC: 139 MG/DL (ref 65–99)
HCT VFR BLD AUTO: 38.2 % (ref 37–47)
HGB BLD-MCNC: 12.2 G/DL (ref 12–16)
MCH RBC QN AUTO: 29.4 PG (ref 27–33)
MCHC RBC AUTO-ENTMCNC: 31.9 G/DL (ref 33.6–35)
MCV RBC AUTO: 92 FL (ref 81.4–97.8)
PATHOLOGY CONSULT NOTE: NORMAL
PLATELET # BLD AUTO: 220 K/UL (ref 164–446)
PMV BLD AUTO: 9.4 FL (ref 9–12.9)
POTASSIUM SERPL-SCNC: 4.8 MMOL/L (ref 3.6–5.5)
RBC # BLD AUTO: 4.15 M/UL (ref 4.2–5.4)
SODIUM SERPL-SCNC: 137 MMOL/L (ref 135–145)
WBC # BLD AUTO: 8.5 K/UL (ref 4.8–10.8)

## 2022-06-22 PROCEDURE — 700111 HCHG RX REV CODE 636 W/ 250 OVERRIDE (IP): Performed by: UROLOGY

## 2022-06-22 PROCEDURE — 80048 BASIC METABOLIC PNL TOTAL CA: CPT

## 2022-06-22 PROCEDURE — A9270 NON-COVERED ITEM OR SERVICE: HCPCS | Performed by: UROLOGY

## 2022-06-22 PROCEDURE — RXMED WILLOW AMBULATORY MEDICATION CHARGE: Performed by: PHYSICIAN ASSISTANT

## 2022-06-22 PROCEDURE — 700101 HCHG RX REV CODE 250: Performed by: UROLOGY

## 2022-06-22 PROCEDURE — 85027 COMPLETE CBC AUTOMATED: CPT

## 2022-06-22 PROCEDURE — 700102 HCHG RX REV CODE 250 W/ 637 OVERRIDE(OP): Performed by: UROLOGY

## 2022-06-22 PROCEDURE — 36415 COLL VENOUS BLD VENIPUNCTURE: CPT

## 2022-06-22 RX ORDER — OXYBUTYNIN CHLORIDE 10 MG/1
10 TABLET, EXTENDED RELEASE ORAL DAILY
Qty: 30 TABLET | Refills: 1 | Status: SHIPPED | OUTPATIENT
Start: 2022-06-22

## 2022-06-22 RX ADMIN — KETOROLAC TROMETHAMINE 15 MG: 30 INJECTION, SOLUTION INTRAMUSCULAR; INTRAVENOUS at 12:28

## 2022-06-22 RX ADMIN — ACETAMINOPHEN 1000 MG: 500 TABLET, FILM COATED ORAL at 00:42

## 2022-06-22 RX ADMIN — TOPIRAMATE 50 MG: 25 TABLET, FILM COATED ORAL at 06:33

## 2022-06-22 RX ADMIN — KETOROLAC TROMETHAMINE 15 MG: 30 INJECTION, SOLUTION INTRAMUSCULAR; INTRAVENOUS at 04:37

## 2022-06-22 RX ADMIN — CEFTRIAXONE SODIUM 1 G: 10 INJECTION, POWDER, FOR SOLUTION INTRAVENOUS at 06:34

## 2022-06-22 RX ADMIN — ACETAMINOPHEN 1000 MG: 500 TABLET, FILM COATED ORAL at 12:28

## 2022-06-22 RX ADMIN — HEPARIN SODIUM 5000 UNITS: 5000 INJECTION, SOLUTION INTRAVENOUS; SUBCUTANEOUS at 06:35

## 2022-06-22 RX ADMIN — ACETAMINOPHEN 1000 MG: 500 TABLET, FILM COATED ORAL at 06:33

## 2022-06-22 RX ADMIN — POLYETHYLENE GLYCOL 3350 1 PACKET: 17 POWDER, FOR SOLUTION ORAL at 06:33

## 2022-06-22 RX ADMIN — OXYCODONE 5 MG: 5 TABLET ORAL at 07:52

## 2022-06-22 ASSESSMENT — PAIN DESCRIPTION - PAIN TYPE: TYPE: ACUTE PAIN

## 2022-06-22 NOTE — OR NURSING
1930 Received report from DANE Genao. AAOx4. Even, unlabored respirations. VSS. C/o 2/10 surgical site pain, declines medication at this time. Denies nausea. Resting comfortably. Waiting for room to be cleaned.     1953 Pt transported to [facility] with transport. Chart, full oxygen tank, and belonings with patient.

## 2022-06-22 NOTE — OR NURSING
1727: Pt arrives to PACU asleep and calm. VSS. Gold in place draining clear yellow urine. 4 sites to abdomen all closed with dermabond, CDI, ice pack applied. JULIO drain to RLQ and left flank dressing both CDI.     1830: Pts  called and updated.    1930: Dressings CDI. Report called to Sendy VELA. Urine in gold bag yellow and clear. Roz VELA assuming care at this time.

## 2022-06-22 NOTE — PROGRESS NOTES
Report received from RN, assumed care at 0700  Pt is A0X4, and responds appropriately   Pt declines any SOB, chest pain, new onset of numbness/ tingiling  Pt rates pain at 6/10, on a scale of 1-10, pt resting at this time   Pt has gold in place draining to gravity, urine is clear and without sedimentation, STAT lock in place    Pt has + flatus, + bowel sounds,  BM PTA  Pt is very hesitant to ambulate due to pain and fear of pain, education provided to pt on importance of  Getting out of bed and taking pain medication to control pain, risks of laying in bed provided to pt, pt agreed to attempt ambulation around breakfast time  Pt is tolerating a regular diet, pt denies any nausea/vomiting  Pt has x4 lap sites to abdomen, approximated with derma bond, open to air, no drainage noted   Pt has RLQ JULIO compressed to self suction, JULIO sutured in place, open to air no drainage noted at this time       Plan of care discussed, all questions answered. Explained importance of calling before getting OOB and pt verbalizes understanding. Explained importance of oral care. Call light is within reach, treaded slipper socks on, bed in lowest/ locked position, hourly rounding in place, all needs met at this time

## 2022-06-22 NOTE — ANESTHESIA TIME REPORT
Anesthesia Start and Stop Event Times     Date Time Event    6/21/2022 1426 Ready for Procedure     1448 Anesthesia Start     1734 Anesthesia Stop        Responsible Staff  06/21/22    Name Role Begin End    Lang Davison M.D. Anesth 1448 1739        Overtime Reason:  overtime    Comments:

## 2022-06-22 NOTE — CARE PLAN
Problem: Pain - Standard  Goal: Alleviation of pain or a reduction in pain to the patient’s comfort goal  Outcome: Progressing     Problem: Knowledge Deficit - Standard  Goal: Patient and family/care givers will demonstrate understanding of plan of care, disease process/condition, diagnostic tests and medications  Outcome: Progressing       The patient is Watcher - Medium risk of patient condition declining or worsening    Shift Goals  Clinical Goals: OOB activity, tolerate diet, pain control  Patient Goals: pain control, rest, comfort    Progress made toward(s) clinical / shift goals:  Patient was able to ambulate to bed from [facility] upon arrival to unit. Patient reported pain to be managed with PRN medications. Educated on pharmacological and non pharmacological modalities. Patient was able to rest throughout the night.     Patient is not progressing towards the following goals:

## 2022-06-22 NOTE — ANESTHESIA POSTPROCEDURE EVALUATION
Patient: Zeenat Hernandez    Procedure Summary     Date: 06/21/22 Room / Location: [place] [id number] / SURGERY [facility]    Anesthesia Start: 1448 Anesthesia Stop: 1734    Procedure: LEFT URETERAL REIMPLANT, ROBOT ASSISTED LAPAROSCOPIC, WITH NEPHROSTOMY REMOVAL (Left Pelvis) Diagnosis: (STRICTURE OF URETER)    Surgeons: Vasu Escalante M.D. Responsible Provider: Lang Davison M.D.    Anesthesia Type: general ASA Status: 2          Final Anesthesia Type: general  Last vitals  BP   Blood Pressure: 125/73    Temp   36.4 °C (97.5 °F)    Pulse   82   Resp   14    SpO2   100 %      Anesthesia Post Evaluation    Patient location during evaluation: PACU  Patient participation: complete - patient participated  Level of consciousness: awake and alert  Pain score: 0    Airway patency: patent  Anesthetic complications: no  Cardiovascular status: hemodynamically stable  Respiratory status: acceptable  Hydration status: euvolemic    PONV: none          There were no known complications for this encounter.

## 2022-06-22 NOTE — CARE PLAN
The patient is Stable - Low risk of patient condition declining or worsening    Shift Goals  Clinical Goals: OOB activity, tolerate diet, pain control, rest  Patient Goals: pain control, rest, comfort    Progress made toward(s) clinical / shift goals:          Problem: Pain - Standard  Goal: Alleviation of pain or a reduction in pain to the patient’s comfort goal  Outcome: Progressing     Problem: Knowledge Deficit - Standard  Goal: Patient and family/care givers will demonstrate understanding of plan of care, disease process/condition, diagnostic tests and medications  Outcome: Progressing

## 2022-06-22 NOTE — OP REPORT
[facility] Operative Report    Pre-operative Diagnosis: 1. left ureteral stricture  2. left hydronephrosis   Post-operative Diagnosis: Same as above   Procedure: 1. Robotic left ureteral reimplantation  2. Left ureteral stent placement  3. Psoas hitch of the bladder (left)  4. Removal of left nephrostomy tube  5.  Intraoperative vascular assessment with indocyanine green and firefly technology to assess the vascular supply of the left ureter   Surgeon Vasu Escalante M.D.,   Assistant: Nahid Olguin MD   Anesthesia:  General ET, see MD ashutosh   Estimated Blood Loss: 75ml       Specimens: 1. Distal left ureter ( ureteral stricture)   Drains: 1. 15fr round drain pelvis 2. 6x26JJ Left ureteral stent without string. 3. 16F gold catheter with 10cc in balloon   Wound class  clean contaminated   Condition and complications Stable, procedure well tolerated without complications   Disposition:  PACU, overnight observation  1. Gold removal in 7-10 days  2. Right ureteral stent removal in 4-6 weeks   Findings: 1.  Distal left ureteral stricture.  Transected the ureter.  ICG demonstrated very healthy green vascularized left ureter at the level of our reimplantation.  2.  Refluxing anastomosis left posterior near the dome.  3.  Psoas hitch to the psoas tendon using 2-0 strata fix     Indications for Procedure:  This patient is a 51-year-old female with left ureteral stricture following hysterectomy approximately 5 years ago, who presented about a month ago to [facility] with left hydronephrosis and imaging demonstrated distal ureteral stricture.  The kidney parenchyma appeared intact and left nephrostomy tube was placed as a temporary drainage for the left kidney.    The risks of any of these surgeries include: bleeding and potentially needing transfusion, urinary tract infection, abdominal infection, urine leak, abdominal hernia, re-stenosis (scar tissue) of the ureter requiring additional procedures or temporizing drains,  potential need for interventional radiology drainage, injury to the bladder/bowel/vasculature, venous thrombosis, pulmonary embolism, stroke, myocardial infarction, and very low risk of death. Specific risks for the different surgeries discussed above:      Ureteral reimplantation - stenosis of the ureter, or scar tissue where the ureter is sewn into the bladder. This risk is estimated near 10%. The ureter is often reimplanted in a “refluxing” manner, which means urine can go from the bladder up the ureter and into the kidney. Some patient may feel pressure or pain in their kidney when they urinate, but this usually improves over time.      Boari Flap - this has slightly higher risk of scar tissue where the bladder meets the ureter. This can also rarely cause some voiding dysfunction or bladder dysfunction       Ureteral surgery lends itself well to minimally invasive surgery using laparoscopic and robotic technology. Most cases will be performed in this manner, but the approach (open surgery vs robotic surgery) will be made on a case-by-case basis. The repair technique is essentially the same, but the downsides to open surgery include: more pain, longer incision, higher rates of hernia, longer hospital stays, wound healing problems, and poor cosmesis. Sometimes open surgery is the only way to successfully manage ureteral strictures and robotic cases can converted to open surgery.      Drains/Tubes/Catheters:     Recovery time will depend on the type of surgery. In general, ureteral stents are left in place to help the ureter heal and these are typically removed at 6 weeks. Sometimes nephrostomy tubes are left in place and will eventually be removed. Overnight hospital stay is usually necessary for 1-5 nights, but most can leave the following day after surgery. A gold catheter (urethral drainage tube) is often left in place, and the duration of the urethral catheter after surgery will depend on the type of repair  needed. Abdominal drains are sometimes left in place to monitor for any leak from the repair.     Recovery:      Patients are asked to not lift anything heavy (>10 pounds) for 4 weeks and no strenuous activity for 4 weeks as they recover from surgery.     Risks discussed, but not limited to, were infection, sepsis, bleeding, bladder injury, need for secondary procedure, possible need for gold post op, possible admission post operatively, and the cardiovascular and pulmonary complications of anesthesia/surgery including DVT, Mi, PE, and death.      Procedure in Detail:  Patient was brought back to the operating room placed in supine position administered general endotracheal anesthesia.  They were then sterilely prepped and draped in the standard fashion using ChloraPrep on the abdomen and vaginal Betadine prep.  Perioperative timeout identify the correct patient laterality procedure and surgeon.  They were given ancef and gentamicin and fluconazole for a intertriginous likely yeast skin rash as preoperative antibiotics.    Gold catheter was placed on the field, 16 Bulgarian draining clear yellow urine.     The patient was placed into steep Trendelenburg.  We gained access for insufflation using a Veress needle in the right upper quadrant, saline drop test was good and low opening pressures confirmed intra-abdominal placement.  Once we are insufflated to 15 mmHg, we placed the midline supraumbilical port for the camera. The remainder of the ports were placed under direct vision, 8mm x3 and 12mm assistant port.  The robot was then docked.  Endo Close 0 Vicryl was used for the 12 mm assistant port.      While reflecting the left  colon medially the right ureter was identified.  There was rather significant inflammation along the peritoneum and dissecting out the inflamed ureter.  The ureter was dissected to the level of the detrusor.  The ureter was sharply transected and then spatulated proximally until there was  healthy ureter.  That healthy dilated ureter was identified approximately 2 cm from the bladder hiatus.    ICG was given intravenously 2 mL, 5 mg and we was firefly to assess the vascular health of the tissue.  Please see above findings.      The bladder was then dropped anteriorly and circumferentially dissected on the anterior surface and some of the lateral attachments on the right and left sides were mobilized to allow the bladder more freedom and mobility cephalad.     Cystotomy was made left side posterior near the dome.    The anastomosis was performed with a series of 4-0 Vicryl sutures at the apex these were interrupted x4.  Each side was then ran with a 4-0 Vicryl.  Prior to the second medial side of the anastomosis 6 x 26 right double-J ureteral stent was placed through the assistant port and up the right ureter.  1 cm of the cystotomy was closed primarily to the most distal aspect of the right ureter.  The bladder was then filled with 120 cc of fluid and leak test was negative.  We then obtained adequate hemostasis.    10 cc of Vistaseal was applied around the left ureter.    Psoas hitch: Using a 2-0 strata fix suture psoas hitch was performed pulling the left dome of the bladder cephalad to allow for tension-free anastomosis.    15 Icelandic round drain was placed and secured through the right robotic 8 mm port.  This was secured with 2-0 nylon.      The 12mm assistant port was closed with 0 Vicryl using a Endo Close device.  All ports were visualized as the ports were removed and the abdomen was then desufflated.  Skin was closed with 4-0 Monocryl in a subcuticular fashion.  Dermabond was applied.  This concluded our portion of the procedure and the anesthesia team then performed the tap block.  The left nephrostomy tube was removed after taking off the drapes.      Blood loss was 75cc. Final sponge and instrument counts correct x2. The patient tolerated the procedure well[place] [id number]  [phone]

## 2022-06-22 NOTE — PROGRESS NOTES
Patient discharged   IV removed prior to discharge.   Meds to bed delivered to pt   Discharge education provided, all questions answered.   Verbalized understanding of discharge education.   Kahn education provided    at bedside for all teaching   Wheeled out with all personal belongings collected from room.

## 2022-06-22 NOTE — DISCHARGE INSTRUCTIONS
Discharge Instructions    Discharged to home by car with relative. Discharged via wheelchair, hospital escort: Yes.  Special equipment needed: Not Applicable    Be sure to schedule a follow-up appointment with your primary care doctor or any specialists as instructed.     Discharge Plan:   Diet Plan: Discussed  Activity Level: Discussed  Confirmed Follow up Appointment: Patient to Call and Schedule Appointment  Confirmed Symptoms Management: Discussed  Medication Reconciliation Updated: Yes    I understand that a diet low in cholesterol, fat, and sodium is recommended for good health. Unless I have been given specific instructions below for another diet, I accept this instruction as my diet prescription.   Other diet: Regular     Special Instructions: None    Is patient discharged on Warfarin / Coumadin?   No     Depression / Suicide Risk    As you are discharged from this [facility] facility, it is important to learn how to keep safe from harming yourself.    Recognize the warning signs:  Abrupt changes in personality, positive or negative- including increase in energy   Giving away possessions  Change in eating patterns- significant weight changes-  positive or negative  Change in sleeping patterns- unable to sleep or sleeping all the time   Unwillingness or inability to communicate  Depression  Unusual sadness, discouragement and loneliness  Talk of wanting to die  Neglect of personal appearance   Rebelliousness- reckless behavior  Withdrawal from people/activities they love  Confusion- inability to concentrate     If you or a loved one observes any of these behaviors or has concerns about self-harm, here's what you can do:  Talk about it- your feelings and reasons for harming yourself  Remove any means that you might use to hurt yourself (examples: pills, rope, extension cords, firearm)  Get professional help from the community (Mental Health, Substance Abuse, psychological counseling)  Do not be alone:Call  your Safe Contact- someone whom you trust who will be there for you.  Call your local CRISIS HOTLINE 525-2598 or 519-590-3570  Call your local Children's Mobile Crisis Response Team [place] (775) 001-7028 or www.[facility]  Call the toll free National Suicide Prevention Hotlines   National Suicide Prevention Lifeline 217-111-GVSD (6846)  [facility] 800-SUICIDE (516-6962)    Indwelling Urinary Catheter Care, Adult  An indwelling urinary catheter is a thin tube that is put into your bladder. The tube helps to drain pee (urine) out of your body. The tube goes in through your urethra. Your urethra is where pee comes out of your body. Your pee will come out through the catheter, then it will go into a bag (drainage bag).  Take good care of your catheter so it will work well.  How to wear your catheter and bag  Supplies needed  Sticky tape (adhesive tape) or a leg strap.  Alcohol wipe or soap and water (if you use tape).  A clean towel (if you use tape).  Large overnight bag.  Smaller bag (leg bag).  Wearing your catheter  Attach your catheter to your leg with tape or a leg strap.  Make sure the catheter is not pulled tight.  If a leg strap gets wet, take it off and put on a dry strap.  If you use tape to hold the bag on your leg:  Use an alcohol wipe or soap and water to wash your skin where the tape made it sticky before.  Use a clean towel to pat-dry that skin.  Use new tape to make the bag stay on your leg.  Wearing your bags  You should have been given a large overnight bag.  You may wear the overnight bag in the day or night.  Always have the overnight bag lower than your bladder.  Do not let the bag touch the floor.  Before you go to sleep, put a clean plastic bag in a wastebasket. Then hang the overnight bag inside the wastebasket.  You should also have a smaller leg bag that fits under your clothes.  Always wear the leg bag below your knee.  Do not wear your leg bag at night.  How to  care for your skin and catheter  Supplies needed  A clean washcloth.  Water and mild soap.  A clean towel.  Caring for your skin and catheter         Clean the skin around your catheter every day:  Wash your hands with soap and water.  Wet a clean washcloth in warm water and mild soap.  Clean the skin around your urethra.  If you are female:  Gently spread the folds of skin around your vagina (labia).  With the washcloth in your other hand, wipe the inner side of your labia on each side. Wipe from front to back.  If you are male:  Pull back any skin that covers the end of your penis (foreskin).  With the washcloth in your other hand, wipe your penis in small circles. Start wiping at the tip of your penis, then move away from the catheter.  Move the foreskin back in place, if needed.  With your free hand, hold the catheter close to where it goes into your body.  Keep holding the catheter during cleaning so it does not get pulled out.  With the washcloth in your other hand, clean the catheter.  Only wipe downward on the catheter.  Do not wipe upward toward your body. Doing this may push germs into your urethra and cause infection.  Use a clean towel to pat-dry the catheter and the skin around it. Make sure to wipe off all soap.  Wash your hands with soap and water.  Shower every day. Do not take baths.  Do not use cream, ointment, or lotion on the area where the catheter goes into your body, unless your doctor tells you to.  Do not use powders, sprays, or lotions on your genital area.  Check your skin around the catheter every day for signs of infection. Check for:  Redness, swelling, or pain.  Fluid or blood.  Warmth.  Pus or a bad smell.  How to empty the bag  Supplies needed  Rubbing alcohol.  Gauze pad or cotton ball.  Tape or a leg strap.  Emptying the bag  Pour the pee out of your bag when it is ?-½ full, or at least 2-3 times a day. Do this for your overnight bag and your leg bag.  Wash your hands with soap and  water.  Separate (detach) the bag from your leg.  Hold the bag over the toilet or a clean pail. Keep the bag lower than your hips and bladder. This is so the pee (urine) does not go back into the tube.  Open the pour spout. It is at the bottom of the bag.  Empty the pee into the toilet or pail. Do not let the pour spout touch any surface.  Put rubbing alcohol on a gauze pad or cotton ball.  Use the gauze pad or cotton ball to clean the pour spout.  Close the pour spout.  Attach the bag to your leg with tape or a leg strap.  Wash your hands with soap and water.  Follow instructions for cleaning the drainage bag:  From the product maker.  As told by your doctor.  How to change the bag  Supplies needed  Alcohol wipes.  A clean bag.  Tape or a leg strap.  Changing the bag  Replace your bag when it starts to leak, smell bad, or look dirty.  Wash your hands with soap and water.  Separate the dirty bag from your leg.  Pinch the catheter with your fingers so that pee does not spill out.  Separate the catheter tube from the bag tube where these tubes connect (at the connection valve). Do not let the tubes touch any surface.  Clean the end of the catheter tube with an alcohol wipe. Use a different alcohol wipe to clean the end of the bag tube.  Connect the catheter tube to the tube of the clean bag.  Attach the clean bag to your leg with tape or a leg strap. Do not make the bag tight on your leg.  Wash your hands with soap and water.  General rules    Never pull on your catheter. Never try to take it out. Doing that can hurt you.  Always wash your hands before and after you touch your catheter or bag. Use a mild, fragrance-free soap. If you do not have soap and water, use hand .  Always make sure there are no twists or bends (kinks) in the catheter tube.  Always make sure there are no leaks in the catheter or bag.  Drink enough fluid to keep your pee pale yellow.  Do not take baths, swim, or use a hot tub.  If you  are female, wipe from front to back after you poop (have a bowel movement).  Contact a doctor if:  Your pee is cloudy.  Your pee smells worse than usual.  Your catheter gets clogged.  Your catheter leaks.  Your bladder feels full.  Get help right away if:  You have redness, swelling, or pain where the catheter goes into your body.  You have fluid, blood, pus, or a bad smell coming from the area where the catheter goes into your body.  Your skin feels warm where the catheter goes into your body.  You have a fever.  You have pain in your:  Belly (abdomen).  Legs.  Lower back.  Bladder.  You see blood in the catheter.  Your pee is pink or red.  You feel sick to your stomach (nauseous).  You throw up (vomit).  You have chills.  Your pee is not draining into the bag.  Your catheter gets pulled out.  Summary  An indwelling urinary catheter is a thin tube that is placed into the bladder to help drain pee (urine) out of the body.  The catheter is placed into the part of the body that drains pee from the bladder (urethra).  Taking good care of your catheter will keep it working properly and help prevent problems.  Always wash your hands before and after touching your catheter or bag.  Never pull on your catheter or try to take it out.  This information is not intended to replace advice given to you by your health care provider. Make sure you discuss any questions you have with your health care provider.  Document Released: 04/14/2014 Document Revised: 04/10/2020 Document Reviewed: 08/03/2018  Elsevier Patient Education © 2020 [employer]    Incision Care, Adult  An incision is a cut that a doctor makes in your skin for surgery (for a procedure). Most times, these cuts are closed after surgery. Your cut from surgery may be closed with stitches (sutures), staples, skin glue, or skin tape (adhesive strips). You may need to return to your doctor to have stitches or staples taken out. This may happen many days or many weeks  after your surgery. The cut needs to be well cared for so it does not get infected.  How to care for your cut  Cut care    Follow instructions from your doctor about how to take care of your cut. Make sure you:  Wash your hands with soap and water before you change your bandage (dressing). If you cannot use soap and water, use hand .  Change your bandage as told by your doctor.  Leave stitches, skin glue, or skin tape in place. They may need to stay in place for 2 weeks or longer. If tape strips get loose and curl up, you may trim the loose edges. Do not remove tape strips completely unless your doctor says it is okay.  Check your cut area every day for signs of infection. Check for:  More redness, swelling, or pain.  More fluid or blood.  Warmth.  Pus or a bad smell.  Ask your doctor how to clean the cut. This may include:  Using mild soap and water.  Using a clean towel to pat the cut dry after you clean it.  Putting a cream or ointment on the cut. Do this only as told by your doctor.  Covering the cut with a clean bandage.  Ask your doctor when you can leave the cut uncovered.  Do not take baths, swim, or use a hot tub until your doctor says it is okay. Ask your doctor if you can take showers. You may only be allowed to take sponge baths for bathing.  Medicines  If you were prescribed an antibiotic medicine, cream, or ointment, take the antibiotic or put it on the cut as told by your doctor. Do not stop taking or putting on the antibiotic even if your condition gets better.  Take over-the-counter and prescription medicines only as told by your doctor.  General instructions  Limit movement around your cut. This helps healing.  Avoid straining, lifting, or exercise for the first month, or for as long as told by your doctor.  Follow instructions from your doctor about going back to your normal activities.  Ask your doctor what activities are safe.  Protect your cut from the sun when you are outside for the  first 6 months, or for as long as told by your doctor. Put on sunscreen around the scar or cover up the scar.  Keep all follow-up visits as told by your doctor. This is important.  Contact a doctor if:  Your have more redness, swelling, or pain around the cut.  You have more fluid or blood coming from the cut.  Your cut feels warm to the touch.  You have pus or a bad smell coming from the cut.  You have a fever or shaking chills.  You feel sick to your stomach (nauseous) or you throw up (vomit).  You are dizzy.  Your stitches or staples come undone.  Get help right away if:  You have a red streak coming from your cut.  Your cut bleeds through the bandage and the bleeding does not stop with gentle pressure.  The edges of your cut open up and separate.  You have very bad (severe) pain.  You have a rash.  You are confused.  You pass out (faint).  You have trouble breathing and you have a fast heartbeat.  This information is not intended to replace advice given to you by your health care provider. Make sure you discuss any questions you have with your health care provider.  Document Released: 03/11/2013 Document Revised: 05/07/2018 Document Reviewed: 08/25/2017  Elsevier Patient Education © 2020 Elsevier Inc[employer]

## 2022-06-22 NOTE — PROGRESS NOTES
"4 Eyes Skin Assessment Completed by DANE Kaba and DANE Alejandro.    Head Edema and redness to R eye  Ears WDL  Nose WDL  Mouth WDL  Neck WDL  Breast/Chest WDL  Shoulder Blades WDL  Spine WDL  (R) Arm/Elbow/Hand WDL  (L) Arm/Elbow/Hand PIV  Abdomen 4 lap stabs with dermabod, L flank dressing from prior neph tube site   Groin gold catheter  Scrotum/Coccyx/Buttocks Redness and Non-Blanching, pt stated \"It is a rash from before I came into the hospital\"   (R) Leg WDL  (L) Leg WDL  (R) Heel/Foot/Toe WDL  (L) Heel/Foot/Toe WDL    Devices In Places Blood Pressure Cuff, Pulse Ox, Gold, SCD's and Nasal Cannula    Interventions In Place Pillows, Barrier Cream and Pressure Redistribution Mattress    Possible Skin Injury No    Pictures Uploaded Into Epic N/A  Wound Consult Placed N/A  RN Wound Prevention Protocol Ordered No     "

## 2022-06-22 NOTE — PROGRESS NOTES
"Received report from DANE Genao.    Pt admitted to room T434 via transport in [facility] from PACU at 2000.  Pt reported 5/10 pain. Medicated per MAR.   Oriented to room call light and smoking policy.  Reviewed plan of care (equipment, incentive spirometer, sequential compression devices, medications, activity, diet, fall precautions, skin care, and pain) with patient and family.   Welcome packet given and reviewed, all questions answered. Education provided on oral hygiene program.     AA&Ox4. Denies CP/SOB.  See 2 RN skin note  Tolerating regular diet. Denies N/V at this time.  + void via gold catheter. - BM. Last BM PTA  Pt ambulates SBA.  All needs met at this time. Call light within reach. Bed alarm on and audible. Pt calls appropriately. Bed low and locked, non skid socks in place. Hourly rounding in place.    /64   Pulse 82   Temp 36 °C (96.8 °F) (Temporal)   Resp 17   Ht 1.6 m (5' 3\")   Wt 80.9 kg (178 lb 5.6 oz)   SpO2 96%   BMI 31.59 kg/m²       "

## 2022-06-22 NOTE — DISCHARGE SUMMARY
Discharge Summary    CHIEF COMPLAINT ON ADMISSION  No chief complaint on file.      Reason for Admission  STRICTURE OF URETER     Admission Date  6/21/2022    CODE STATUS  Full Code    HPI & HOSPITAL COURSE  This is a 51 y.o. female here s/p left ureteral implant with psoas hitch and left ureteral stent placement. She suffered no overnight events. Not very motivated to ambulate d/t some pain, but overall feeling ok while laying still. Kahn bag draining clear yellow. Labs wnl. Vss. Tolerating some food, but limited d/t celiac's disease. Wanting to go home.   No notes on file    Therefore, she is discharged in good and stable condition to home with close outpatient follow-up.    The patient recovered much more quickly than anticipated on admission.    Discharge Date  6/22/22    FOLLOW UP ITEMS POST DISCHARGE  Our office POD 10, scheduled.     DISCHARGE DIAGNOSES  Principal Problem:    Ureteral stricture POA: Yes  Active Problems:    Stricture or kinking of ureter POA: Yes  Resolved Problems:    * No resolved hospital problems. *      FOLLOW UP  No future appointments.  No follow-up provider specified.    MEDICATIONS ON DISCHARGE     Medication List      START taking these medications      Instructions   nitrofurantoin 100 MG Caps  Commonly known as: Macrobid   Doctor's comments: Daily preventative  Take 1 Capsule by mouth every day.  Dose: 100 mg     oxybutynin SR 10 MG CR tablet  Commonly known as: DITROPAN-XL   Take 1 Tablet by mouth every day.  Dose: 10 mg     oxyCODONE immediate-release 5 MG Tabs  Commonly known as: ROXICODONE   Take 1 Tablet by mouth every four hours as needed for Severe Pain for up to 3 days.  Dose: 5 mg        CONTINUE taking these medications      Instructions   AZO TABS PO   Take 2 Tablets by mouth 3 times a day as needed.  Dose: 2 Tablet     multivitamin Tabs   Take 1 Tablet by mouth every day.  Dose: 1 Tablet     topiramate 50 MG tablet  Commonly known as: TOPAMAX   Take 50 mg by mouth 2  times a day.  Dose: 50 mg            Allergies  Allergies   Allergen Reactions   • Gluten Meal Unspecified     Celiac disease   • Lactose Intolerance (Gi) [Lactose] Diarrhea     Diarrhea. Can tolerate small amounts of lactose       DIET  Orders Placed This Encounter   Procedures   • Diet Order Diet: Regular     Standing Status:   Standing     Number of Occurrences:   1     Order Specific Question:   Diet:     Answer:   Regular [1]       ACTIVITY  As tolerated.  Weight bearing as tolerated    CONSULTATIONS  none    PROCEDURES  Left ureteral reimplant with psoas hitch and stent placement    LABORATORY  Lab Results   Component Value Date    SODIUM 137 06/22/2022    POTASSIUM 4.8 06/22/2022    CHLORIDE 106 06/22/2022    CO2 23 06/22/2022    GLUCOSE 139 (H) 06/22/2022    BUN 17 06/22/2022    CREATININE 1.10 06/22/2022        Lab Results   Component Value Date    WBC 8.5 06/22/2022    HEMOGLOBIN 12.2 06/22/2022    HEMATOCRIT 38.2 06/22/2022    PLATELETCT 220 06/22/2022        Total time of the discharge process exceeds 35 minutes.

## 2024-05-12 ENCOUNTER — OFFICE VISIT (OUTPATIENT)
Dept: URGENT CARE | Facility: PHYSICIAN GROUP | Age: 54
End: 2024-05-12
Payer: COMMERCIAL

## 2024-05-12 ENCOUNTER — HOSPITAL ENCOUNTER (OUTPATIENT)
Facility: MEDICAL CENTER | Age: 54
End: 2024-05-12
Payer: COMMERCIAL

## 2024-05-12 VITALS
WEIGHT: 206 LBS | DIASTOLIC BLOOD PRESSURE: 62 MMHG | TEMPERATURE: 96.8 F | SYSTOLIC BLOOD PRESSURE: 104 MMHG | HEART RATE: 99 BPM | OXYGEN SATURATION: 97 % | RESPIRATION RATE: 18 BRPM | BODY MASS INDEX: 37.91 KG/M2 | HEIGHT: 62 IN

## 2024-05-12 DIAGNOSIS — J02.9 EXUDATIVE PHARYNGITIS: ICD-10-CM

## 2024-05-12 DIAGNOSIS — R06.2 WHEEZING: ICD-10-CM

## 2024-05-12 DIAGNOSIS — R05.1 ACUTE COUGH: ICD-10-CM

## 2024-05-12 LAB — S PYO DNA SPEC NAA+PROBE: NOT DETECTED

## 2024-05-12 PROCEDURE — 99203 OFFICE O/P NEW LOW 30 MIN: CPT

## 2024-05-12 PROCEDURE — 87651 STREP A DNA AMP PROBE: CPT

## 2024-05-12 RX ORDER — METHYLPREDNISOLONE 4 MG/1
TABLET ORAL
Qty: 21 TABLET | Refills: 0 | Status: SHIPPED | OUTPATIENT
Start: 2024-05-12

## 2024-05-12 RX ORDER — RIMEGEPANT SULFATE 75 MG/75MG
TABLET, ORALLY DISINTEGRATING ORAL
COMMUNITY
Start: 2024-04-23

## 2024-05-12 RX ORDER — AMOXICILLIN 500 MG/1
500 CAPSULE ORAL 2 TIMES DAILY
Qty: 20 CAPSULE | Refills: 0 | Status: SHIPPED | OUTPATIENT
Start: 2024-05-12 | End: 2024-05-22

## 2024-05-12 RX ORDER — BENZONATATE 100 MG/1
100 CAPSULE ORAL 3 TIMES DAILY PRN
Qty: 60 CAPSULE | Refills: 0 | Status: SHIPPED | OUTPATIENT
Start: 2024-05-12

## 2024-05-12 RX ORDER — RIZATRIPTAN BENZOATE 5 MG/1
TABLET ORAL
COMMUNITY
Start: 2024-02-19

## 2024-05-12 RX ORDER — DIAZEPAM 5 MG/1
TABLET ORAL
COMMUNITY

## 2024-05-12 ASSESSMENT — ENCOUNTER SYMPTOMS
WEAKNESS: 0
SHORTNESS OF BREATH: 0
HEADACHES: 0
EYE PAIN: 0
NAUSEA: 0
COUGH: 1
MYALGIAS: 0
STRIDOR: 0
NECK PAIN: 0
DIARRHEA: 0
SORE THROAT: 1
TINGLING: 0
VOMITING: 0
BLURRED VISION: 0
SPUTUM PRODUCTION: 0
FEVER: 0
PHOTOPHOBIA: 0
ABDOMINAL PAIN: 0
WHEEZING: 1
DOUBLE VISION: 0
DIZZINESS: 0
CHILLS: 1
SINUS PAIN: 0
SENSORY CHANGE: 0

## 2024-05-12 ASSESSMENT — FIBROSIS 4 INDEX: FIB4 SCORE: 1.1

## 2024-05-12 ASSESSMENT — VISUAL ACUITY: OU: 1

## 2024-05-12 NOTE — PROGRESS NOTES
"Subjective     ClarissaJennifer Hernandez is a 53 y.o. female who presents with cough and sore throat x1.5 weeks.     HPI:   Zeenat is a 54yo female presenting for sore throat and cough x1.5 weeks. Reports associated swollen anterior cervical lymph nodes, exudate in throat, and coughing fits. Denies abdominal pain, vomiting, or diarrhea. No fever. Denies shortness of breath or increased work of breathing. No history of asthma or lung illness. Denies dysphagia or difficulty managing oral secretions. No rash. She has attempted Mucinex and Dayquil with moderate relief.        Review of Systems   Constitutional:  Positive for chills. Negative for fever.   HENT:  Positive for congestion and sore throat. Negative for ear discharge, ear pain and sinus pain.    Eyes:  Negative for blurred vision, double vision, photophobia and pain.   Respiratory:  Positive for cough and wheezing (intermittent). Negative for sputum production, shortness of breath and stridor.    Gastrointestinal:  Negative for abdominal pain, diarrhea, nausea and vomiting.   Musculoskeletal:  Negative for myalgias and neck pain.   Skin:  Negative for rash.   Neurological:  Negative for dizziness, tingling, sensory change, weakness and headaches.     Past Medical History:   Diagnosis Date    Arrhythmia     \"heart flutters\"    Bowel habit changes     occasional diarrhea    Heart murmur     ASD    Hiatus hernia syndrome     Migraine     Psychiatric problem     history of anxiety, no medication    Renal disorder 06/17/2022    blocked ureter nephrostomy tube placed last week    Urinary incontinence 06/17/2022    stress incontinence      Past Surgical History:   Procedure Laterality Date    PA LAP,REIMPLANT URETER W/O CYSTO,STENT Left 6/21/2022    Procedure: LEFT URETERAL REIMPLANT, ROBOT ASSISTED LAPAROSCOPIC, WITH NEPHROSTOMY REMOVAL;  Surgeon: Vasu Escalante M.D.;  Location: SURGERY [facility];  Service: Uro Robotic    HYSTERECTOMY LAPAROSCOPY  2018    TUBAL LIGATION  " "1994      Allergies: Gluten meal and Lactose intolerance (gi) [lactose]     Current Outpatient Medications:     diazePAM (VALIUM) 5 MG Tab, diazepam 5 mg tablet  Take 1 pill by mouth 5 to 15 minutes prior to procedure  Need  for procedure, Disp: , Rfl:     NURTEC 75 MG TABLET DISPERSIBLE, DISSOLVE 1 TABLET ON THE TONGUE DAILY AS NEEDED FOR MIGRAINES, Disp: , Rfl:     rizatriptan (MAXALT) 5 MG tablet, , Disp: , Rfl:     multivitamin (THERAGRAN) Tab, Take 1 Tablet by mouth every day., Disp: , Rfl:     topiramate (TOPAMAX) 50 MG tablet, Take 50 mg by mouth 2 times a day., Disp: , Rfl:     Social History     Tobacco Use    Smoking status: Never    Smokeless tobacco: Never   Vaping Use    Vaping Use: Never used   Substance Use Topics    Alcohol use: Not Currently    Drug use: Never      Medications, Allergies, and current problem list reviewed today in Epic.      Objective     /62 (BP Location: Right arm, Patient Position: Sitting, BP Cuff Size: Large adult)   Pulse 99   Temp 36 °C (96.8 °F) (Temporal)   Resp 18   Ht 1.575 m (5' 2\")   Wt 93.4 kg (206 lb)   SpO2 97%   BMI 37.68 kg/m²      Physical Exam  Vitals reviewed.   Constitutional:       General: She is not in acute distress.  HENT:      Right Ear: Tympanic membrane, ear canal and external ear normal.      Left Ear: Tympanic membrane, ear canal and external ear normal.      Nose: Congestion present.      Mouth/Throat:      Pharynx: Uvula midline. Oropharyngeal exudate and posterior oropharyngeal erythema present. No uvula swelling.   Eyes:      General: Vision grossly intact. Gaze aligned appropriately. No visual field deficit.     Extraocular Movements: Extraocular movements intact.      Conjunctiva/sclera: Conjunctivae normal.      Pupils: Pupils are equal, round, and reactive to light.   Cardiovascular:      Rate and Rhythm: Normal rate and regular rhythm.      Pulses: Normal pulses.      Heart sounds: Normal heart sounds.   Pulmonary:      " Effort: Pulmonary effort is normal. No tachypnea, accessory muscle usage, prolonged expiration, respiratory distress or retractions.      Breath sounds: Normal breath sounds. No stridor. No wheezing, rhonchi or rales.   Musculoskeletal:      Cervical back: Full passive range of motion without pain, normal range of motion and neck supple. Tenderness present. No rigidity. Normal range of motion.   Lymphadenopathy:      Cervical: Cervical adenopathy present.   Skin:     General: Skin is warm and dry.   Neurological:      Mental Status: She is alert. Mental status is at baseline.   Psychiatric:         Mood and Affect: Mood normal.         Behavior: Behavior normal.         Thought Content: Thought content normal.       Results for orders placed or performed in visit on 05/12/24   POCT CEPHEID GROUP A STREP - PCR   Result Value Ref Range    POC Group A Strep, PCR Not Detected Not Detected, Invalid     Assessment & Plan     1. Exudative pharyngitis   - POCT CEPHEID GROUP A STREP - PCR  - CULTURE THROAT; Future  - amoxicillin (AMOXIL) 500 MG Cap; Take 1 Capsule by mouth 2 times a day for 10 days.  Dispense: 20 Capsule; Refill: 0    2. Wheezing   - methylPREDNISolone (MEDROL DOSEPAK) 4 MG Tablet Therapy Pack; Follow schedule on package instructions.  Dispense: 21 Tablet; Refill: 0    3. Acute cough   - benzonatate (TESSALON) 100 MG Cap; Take 1 Capsule by mouth 3 times a day as needed for Cough.  Dispense: 60 Capsule; Refill: 0       MDM/Comments:    Patient has stable vital signs and is non-toxic appearing.       Patient tested negative for strep A. Presentation is consistent with exudative pharyngitis. No drooling, airway compromise, or deviated uvula present. No hoarseness in voice. She will be prescribed Amoxicillin until throat culture is resulted, at which time I will advise patient whether to continue or stop antibiotic therapy.   Discussed treatment plan with patient, patient is agreeable and verbalized  understanding. Educated patient on signs and symptoms to watch out for, when to return to the clinic or go to the ER.      Management includes completion of antibiotics, new toothbrush after day 3 of antibiotics, discarding/sanitizing any other dental equipment, soft foods, increased fluids, remain home from school for 24 hours.   Management of symptoms is discussed and expected course is outlined.   Patient instructed to return to office in 24-48 hours if not improving  Patient instructed to present to Emergency Room if notes unilateral swelling, muffled voice, difficulty handling secretions  I considered other causes of pharyngitis including Group C, G strep, peritonsillar abscess, mariusz's angina, and retropharyngeal abscess but the patient's reported symptoms and my exam do not support these alternative diagnosis based on information I have available today.  This may change and I encouraged the patient to return to clinic if they are experiencing new symptoms or their symptoms fail to resolve with time as we cannot rule out all pathology from a single Urgent Care visit.      Seek emergency care if:   severe pain on one side of the throat  difficulty and pain when swallowing or opening the mouth  fever and chills  headache  earache  drooling  a muffled or hoarse voice  Bad breath      Differential diagnosis, natural history, supportive care, and indications for immediate follow-up discussed.      Recommended supportive treatment at home:    Warm salt water gargles   Increased fluid intake     Follow-up as needed if symptoms worsen or fail to improve to PCP, Urgent care or Emergency Room.                         Electronically signed by ROBEL Ureña

## 2024-05-12 NOTE — LETTER
[facility] [place]  [address]  [place] [address]     May 12, 2024    Patient: Zeenat Hernandez   YOB: 1970   Date of Visit: 5/12/2024       To Whom It May Concern:    Zeenat Hernandez was seen and treated in our department on 5/12/2024.     Please excuse Zeenat from work 5/13/24.         Sincerely,     REGINE Land.

## 2024-05-13 DIAGNOSIS — J02.9 EXUDATIVE PHARYNGITIS: ICD-10-CM

## 2024-05-15 LAB
BACTERIA SPEC RESP CULT: NORMAL
SIGNIFICANT IND 70042: NORMAL
SITE SITE: NORMAL
SOURCE SOURCE: NORMAL

## 2024-08-03 ENCOUNTER — OFFICE VISIT (OUTPATIENT)
Dept: URGENT CARE | Facility: PHYSICIAN GROUP | Age: 54
End: 2024-08-03
Payer: COMMERCIAL

## 2024-08-03 VITALS
HEIGHT: 62 IN | RESPIRATION RATE: 16 BRPM | BODY MASS INDEX: 36.8 KG/M2 | WEIGHT: 200 LBS | TEMPERATURE: 98.7 F | DIASTOLIC BLOOD PRESSURE: 60 MMHG | SYSTOLIC BLOOD PRESSURE: 104 MMHG | OXYGEN SATURATION: 95 % | HEART RATE: 95 BPM

## 2024-08-03 DIAGNOSIS — B37.89 CANDIDA RASH OF GROIN: ICD-10-CM

## 2024-08-03 PROCEDURE — 3074F SYST BP LT 130 MM HG: CPT | Performed by: NURSE PRACTITIONER

## 2024-08-03 PROCEDURE — 99213 OFFICE O/P EST LOW 20 MIN: CPT | Performed by: NURSE PRACTITIONER

## 2024-08-03 PROCEDURE — 3078F DIAST BP <80 MM HG: CPT | Performed by: NURSE PRACTITIONER

## 2024-08-03 RX ORDER — NYSTATIN 100000 [USP'U]/G
1 POWDER TOPICAL 3 TIMES DAILY
Qty: 30 APPLICATION | Refills: 0 | Status: SHIPPED | OUTPATIENT
Start: 2024-08-03 | End: 2024-08-13

## 2024-08-03 RX ORDER — FLUCONAZOLE 150 MG/1
150 TABLET ORAL
Qty: 4 TABLET | Refills: 0 | Status: SHIPPED | OUTPATIENT
Start: 2024-08-03 | End: 2024-09-02

## 2024-08-03 NOTE — PROGRESS NOTES
Date: 08/03/24        Chief Complaint   Patient presents with    Rash     Rash under stomach and vaginal, itchy, burning x4 months        History of Present Illness: 53 y.o.  female presents to clinic with rash under pannus and in groin x 4 months.  Patient states that she has been having itching under her pannus and in groin, but is unable to see the rash.  She has been treating with Neosporin and Goldbond powder without relief.  She has had similar symptoms underneath her bilateral breasts, but is not currently having symptoms.  She denies any recent antibiotic use.  She denies any changes in urination or vaginal symptoms.    ROS:  As otherwise stated in HPI    Medical/SX/ Social History:  Reviewed per chart    Pertinent Medications:    Current Outpatient Medications on File Prior to Visit   Medication Sig Dispense Refill    NURTEC 75 MG TABLET DISPERSIBLE DISSOLVE 1 TABLET ON THE TONGUE DAILY AS NEEDED FOR MIGRAINES      topiramate (TOPAMAX) 50 MG tablet Take 50 mg by mouth 2 times a day.      diazePAM (VALIUM) 5 MG Tab diazepam 5 mg tablet   Take 1 pill by mouth 5 to 15 minutes prior to procedure   Need  for procedure (Patient not taking: Reported on 8/3/2024)      rizatriptan (MAXALT) 5 MG tablet  (Patient not taking: Reported on 8/3/2024)      methylPREDNISolone (MEDROL DOSEPAK) 4 MG Tablet Therapy Pack Follow schedule on package instructions. (Patient not taking: Reported on 8/3/2024) 21 Tablet 0    benzonatate (TESSALON) 100 MG Cap Take 1 Capsule by mouth 3 times a day as needed for Cough. (Patient not taking: Reported on 8/3/2024) 60 Capsule 0    oxybutynin SR (DITROPAN-XL) 10 MG CR tablet Take 1 Tablet by mouth every day. 30 Tablet 1    nitrofurantoin (MACROBID) 100 MG Cap Take 1 Capsule by mouth every day. 10 Capsule 0    Phenazopyridine HCl (AZO TABS PO) Take 2 Tablets by mouth 3 times a day as needed.      multivitamin (THERAGRAN) Tab Take 1 Tablet by mouth every day. (Patient not taking: Reported  on 8/3/2024)       No current facility-administered medications on file prior to visit.        Allergies:    Gluten meal and Lactose intolerance (gi) [lactose]     Problem list, medications, and allergies reviewed by myself today in Epic     Physical Exam:    Vitals:    08/03/24 1530   BP: 104/60   Pulse: 95   Resp: 16   Temp: 37.1 °C (98.7 °F)   SpO2: 95%             Physical Exam  Constitutional:       General: She is not in acute distress.     Appearance: Normal appearance. She is obese. She is not ill-appearing, toxic-appearing or diaphoretic.   Pulmonary:      Effort: Pulmonary effort is normal.   Skin:     Findings: Erythema and rash present. Rash is macular.      Comments: Moist beefy red demarcated rash under pannus. Satellite lesions noted. Fissures noted to multiple folds and extending down inguinal canals.    Neurological:      General: No focal deficit present.      Mental Status: She is alert.   Psychiatric:         Mood and Affect: Mood normal.         Behavior: Behavior normal.         Thought Content: Thought content normal.         Judgment: Judgment normal.         Medical Decision making and plan :  I personally reviewed prior external notes and test results pertinent to today's visit. Pt is clinically stable at today's acute urgent care visit.  Patient appears nontoxic with no acute distress noted. Appropriate for outpatient care at this time.      Pleasant 53 y.o. female presented clinic with rash under pannus and in groin x4 months.  Low suspicion for STI with no vaginal symptoms.  Symptoms consistent with candida rash of groin and pannus. Prescribed fluconazole and nystatin powder for rash.  Educated to keep area clean and dry and to avoid scratching.  Recommended utilizing cotton undergarments and to avoid putting creams on affected areas.    1. Candida-induced panniculitis    - fluconazole (DIFLUCAN) 150 MG tablet; Take 1 Tablet by mouth every 7 days for 30 days.  Dispense: 4 Tablet; Refill:  0  - nystatin (MYCOSTATIN) powder; Apply 1 g topically 3 times a day for 10 days.  Dispense: 30 Application; Refill: 0    2. Candida rash of groin          Shared decision-making was utilized with patient for treatment plan. Medication discussed included indication for use and the potential benefits and side effects. Education was provided regarding the aforementioned assessments.  Differential Diagnosis, natural history, and supportive care discussed. All of the patient's questions were answered to their satisfaction at the time of discharge. Patient was encouraged to monitor symptoms closely. Those signs and symptoms which would warrant concern and mandate seeking a higher level of service through the emergency department discussed at length.  Patient stated agreement and understanding of this plan of care.    Disposition:  Home in stable condition.      Voice Recognition Disclaimer:  Portions of this document were created using voice recognition software. The software does have a chance of producing errors of grammar and possibly content. I have made every reasonable attempt to correct obvious errors, but there may be errors of grammar and possibly content that I did not discover before finalizing the documentation.    Autumn Dominguez, Student

## 2025-01-18 ENCOUNTER — OFFICE VISIT (OUTPATIENT)
Dept: URGENT CARE | Facility: PHYSICIAN GROUP | Age: 55
End: 2025-01-18
Payer: COMMERCIAL

## 2025-01-18 VITALS
WEIGHT: 214 LBS | SYSTOLIC BLOOD PRESSURE: 108 MMHG | BODY MASS INDEX: 39.38 KG/M2 | HEIGHT: 62 IN | OXYGEN SATURATION: 96 % | TEMPERATURE: 97.7 F | DIASTOLIC BLOOD PRESSURE: 76 MMHG | RESPIRATION RATE: 16 BRPM | HEART RATE: 94 BPM

## 2025-01-18 DIAGNOSIS — K02.9 DENTAL CARIES: ICD-10-CM

## 2025-01-18 DIAGNOSIS — R22.0 RIGHT FACIAL SWELLING: ICD-10-CM

## 2025-01-18 PROCEDURE — 3074F SYST BP LT 130 MM HG: CPT | Performed by: PHYSICIAN ASSISTANT

## 2025-01-18 PROCEDURE — 99213 OFFICE O/P EST LOW 20 MIN: CPT | Performed by: PHYSICIAN ASSISTANT

## 2025-01-18 PROCEDURE — 3078F DIAST BP <80 MM HG: CPT | Performed by: PHYSICIAN ASSISTANT

## 2025-03-08 ENCOUNTER — OFFICE VISIT (OUTPATIENT)
Dept: URGENT CARE | Facility: PHYSICIAN GROUP | Age: 55
End: 2025-03-08
Payer: COMMERCIAL

## 2025-03-08 VITALS
BODY MASS INDEX: 38.77 KG/M2 | HEIGHT: 62 IN | TEMPERATURE: 97.6 F | WEIGHT: 210.7 LBS | SYSTOLIC BLOOD PRESSURE: 110 MMHG | OXYGEN SATURATION: 96 % | DIASTOLIC BLOOD PRESSURE: 80 MMHG | RESPIRATION RATE: 16 BRPM | HEART RATE: 100 BPM

## 2025-03-08 DIAGNOSIS — J98.8 RESPIRATORY TRACT INFECTION: ICD-10-CM

## 2025-03-08 PROCEDURE — 3074F SYST BP LT 130 MM HG: CPT | Performed by: NURSE PRACTITIONER

## 2025-03-08 PROCEDURE — 1126F AMNT PAIN NOTED NONE PRSNT: CPT | Performed by: NURSE PRACTITIONER

## 2025-03-08 PROCEDURE — 99213 OFFICE O/P EST LOW 20 MIN: CPT | Performed by: NURSE PRACTITIONER

## 2025-03-08 PROCEDURE — 3079F DIAST BP 80-89 MM HG: CPT | Performed by: NURSE PRACTITIONER

## 2025-03-08 RX ORDER — ALBUTEROL SULFATE 90 UG/1
2 INHALANT RESPIRATORY (INHALATION) EVERY 6 HOURS PRN
Qty: 8.5 G | Refills: 0 | Status: SHIPPED | OUTPATIENT
Start: 2025-03-08

## 2025-03-08 RX ORDER — AZITHROMYCIN 250 MG/1
TABLET, FILM COATED ORAL
Qty: 6 TABLET | Refills: 0 | Status: SHIPPED | OUTPATIENT
Start: 2025-03-08

## 2025-03-08 RX ORDER — FLUTICASONE PROPIONATE 50 MCG
1 SPRAY, SUSPENSION (ML) NASAL DAILY
Qty: 16 G | Refills: 0 | Status: SHIPPED | OUTPATIENT
Start: 2025-03-08

## 2025-03-08 RX ORDER — LORATADINE 10 MG/1
1 CAPSULE, LIQUID FILLED ORAL DAILY
Qty: 30 CAPSULE | Refills: 0 | Status: SHIPPED | OUTPATIENT
Start: 2025-03-08 | End: 2025-04-07

## 2025-03-08 RX ORDER — BENZONATATE 100 MG/1
200 CAPSULE ORAL 3 TIMES DAILY PRN
Qty: 45 CAPSULE | Refills: 0 | Status: SHIPPED | OUTPATIENT
Start: 2025-03-08 | End: 2025-03-23

## 2025-03-08 ASSESSMENT — ENCOUNTER SYMPTOMS
COUGH: 1
FEVER: 0
WHEEZING: 1
SORE THROAT: 0
RHINORRHEA: 1
CHILLS: 0

## 2025-03-08 ASSESSMENT — PAIN SCALES - GENERAL: PAINLEVEL_OUTOF10: NO PAIN

## 2025-03-08 NOTE — PATIENT INSTRUCTIONS
Cough, Adult  Coughing is a reflex that clears your throat and your airways (respiratory system). Coughing helps to heal and protect your lungs. It is normal to cough occasionally, but a cough that happens with other symptoms or lasts a long time may be a sign of a condition that needs treatment. An acute cough may only last 2-3 weeks, while a chronic cough may last 8 or more weeks.  Coughing is commonly caused by:  Infection of the respiratory systemby viruses or bacteria.  Breathing in substances that irritate your lungs.  Allergies.  Asthma.  Mucus that runs down the back of your throat (postnasal drip).  Smoking.  Acid backing up from the stomach into the esophagus (gastroesophageal reflux).  Certain medicines.  Chronic lung problems.  Other medical conditions such as heart failure or a blood clot in the lung (pulmonary embolism).  Follow these instructions at home:  Medicines  Take over-the-counter and prescription medicines only as told by your health care provider.  Talk with your health care provider before you take a cough suppressant medicine.  Lifestyle    Avoid cigarette smoke. Do not use any products that contain nicotine or tobacco, such as cigarettes, e-cigarettes, and chewing tobacco. If you need help quitting, ask your health care provider.  Drink enough fluid to keep your urine pale yellow.  Avoid caffeine.  Do not drink alcohol if your health care provider tells you not to drink.  General instructions    Pay close attention to changes in your cough. Tell your health care provider about them.  Always cover your mouth when you cough.  Avoid things that make you cough, such as perfume, candles, cleaning products, or campfire or tobacco smoke.  If the air is dry, use a cool mist vaporizer or humidifier in your bedroom or your home to help loosen secretions.  If your cough is worse at night, try to sleep in a semi-upright position.  Rest as needed.  Keep all follow-up visits as told by your health care  provider. This is important.  Contact a health care provider if you:  Have new symptoms.  Cough up pus.  Have a cough that does not get better after 2-3 weeks or gets worse.  Cannot control your cough with cough suppressant medicines and you are losing sleep.  Have pain that gets worse or pain that is not helped with medicine.  Have a fever.  Have unexplained weight loss.  Have night sweats.  Get help right away if:  You cough up blood.  You have difficulty breathing.  Your heartbeat is very fast.  These symptoms may represent a serious problem that is an emergency. Do not wait to see if the symptoms will go away. Get medical help right away. Call your local emergency services (911 in the [place] Do not drive yourself to the hospital.  Summary  Coughing is a reflex that clears your throat and your airways. It is normal to cough occasionally, but a cough that happens with other symptoms or lasts a long time may be a sign of a condition that needs treatment.  Take over-the-counter and prescription medicines only as told by your health care provider.  Always cover your mouth when you cough.  Contact a health care provider if you have new symptoms or a cough that does not get better after 2-3 weeks or gets worse.  This information is not intended to replace advice given to you by your health care provider. Make sure you discuss any questions you have with your health care provider.  Document Revised: 01/06/2020 Document Reviewed: 01/06/2020  Elsevier Patient Education © 2023 [employer]

## 2025-03-08 NOTE — LETTER
[facility] [place]  [address]  [place] [address]     March 8, 2025    Patient: Zeenat Hernandez   YOB: 1970   Date of Visit: 3/8/2025       To Whom It May Concern:    Zeenat Hernandez was seen and treated in our department on 3/8/2025. It's my medical opinion that this patient would benefit from rest and recuperation for 4 days.   May return to work/school/ on 3/12/2025, or sooner if feeling better.      Sincerely,     REGINE Ashraf.

## 2025-03-08 NOTE — PROGRESS NOTES
Patient/parent/guardian has consented to treatment and for use of patient information for treatment and billing purposes.  Subjective:   Zeenat Hernandez  is a 54 y.o. female who presents for Cough (2 months )       Cough  This is a new problem. The current episode started more than 1 month ago (2 months). The problem has been waxing and waning. The cough is Productive of sputum. Associated symptoms include nasal congestion, postnasal drip, rhinorrhea and wheezing. Pertinent negatives include no chills, fever or sore throat. The symptoms are aggravated by lying down. She has tried OTC cough suppressant and rest for the symptoms. The treatment provided mild relief.       Review of Systems   Constitutional:  Negative for chills and fever.   HENT:  Positive for postnasal drip and rhinorrhea. Negative for sore throat.    Respiratory:  Positive for cough and wheezing.          CURRENT MEDICATIONS:  AZO TABS PO  benzonatate Caps  diazePAM Tabs  methylPREDNISolone Tbpk  multivitamin Tabs  nitrofurantoin Caps  Nurtec Tbdp  oxybutynin SR  rizatriptan  topiramate  Allergies:     Allergies   Allergen Reactions    Gluten Meal Unspecified     Celiac disease    Lactose Intolerance (Gi) [Lactose] Diarrhea     Diarrhea. Can tolerate small amounts of lactose     Current Problems: Zeenat Hernandez does not have any pertinent problems on file.  Past Surgical Hx:    Past Surgical History:   Procedure Laterality Date    [place] LAP,REIMPLANT URETER W/O CYSTO,STENT Left 6/21/2022    Procedure: LEFT URETERAL REIMPLANT, ROBOT ASSISTED LAPAROSCOPIC, WITH NEPHROSTOMY REMOVAL;  Surgeon: Vasu Escalante M.D.;  Location: SURGERY [facility];  Service: Uro Robotic    HYSTERECTOMY LAPAROSCOPY  2018    TUBAL LIGATION  1994      Past Social Hx:  reports that she has never smoked. She has never used smokeless tobacco. She reports that she does not currently use alcohol. She reports that she does not use drugs.    Objective:   /80 (BP Location:  "Left arm, Patient Position: Sitting, BP Cuff Size: Large adult)   Pulse 100   Temp 36.4 °C (97.6 °F) (Temporal)   Resp 16   Ht 1.575 m (5' 2\")   Wt 95.6 kg (210 lb 11.2 oz)   SpO2 96%   BMI 38.54 kg/m²   Physical Exam  Vitals and nursing note reviewed.   Constitutional:       General: She is not in acute distress.     Appearance: Normal appearance. She is normal weight. She is ill-appearing.   HENT:      Head: Normocephalic and atraumatic.      Right Ear: External ear normal. Tenderness present. No swelling. A middle ear effusion is present. Tympanic membrane is bulging. Tympanic membrane is not erythematous.      Left Ear: External ear normal. Tenderness present. No swelling. A middle ear effusion is present. Tympanic membrane is bulging. Tympanic membrane is not erythematous.      Nose: Mucosal edema, congestion and rhinorrhea present. Rhinorrhea is clear.      Right Sinus: No maxillary sinus tenderness or frontal sinus tenderness.      Left Sinus: No maxillary sinus tenderness or frontal sinus tenderness.      Comments: Hoarse nasally voice     Mouth/Throat:      Mouth: Mucous membranes are moist.      Pharynx: Postnasal drip present. No oropharyngeal exudate or posterior oropharyngeal erythema.   Eyes:      Extraocular Movements: Extraocular movements intact.      Conjunctiva/sclera: Conjunctivae normal.      Pupils: Pupils are equal, round, and reactive to light.   Cardiovascular:      Rate and Rhythm: Normal rate and regular rhythm.      Pulses: Normal pulses.      Heart sounds: Normal heart sounds.   Pulmonary:      Effort: Pulmonary effort is normal.      Breath sounds: Normal breath sounds. No decreased breath sounds, wheezing, rhonchi or rales.      Comments: Deep inspiration triggers cough  Abdominal:      Palpations: Abdomen is soft.      Tenderness: There is no abdominal tenderness.   Musculoskeletal:         General: Normal range of motion.      Cervical back: Normal range of motion and neck " supple.   Skin:     General: Skin is warm and dry.      Findings: No rash.   Neurological:      General: No focal deficit present.      Mental Status: She is alert and oriented to person, place, and time.   Psychiatric:         Mood and Affect: Mood normal.         Behavior: Behavior normal.       Assessment/Plan:   1. Respiratory tract infection  - benzonatate (TESSALON) 100 MG Cap; Take 2 Capsules by mouth 3 times a day as needed for Cough for up to 15 days.  Dispense: 45 Capsule; Refill: 0  - Loratadine (CLARITIN) 10 MG Cap; Take 1 Tablet by mouth every day for 30 days.  Dispense: 30 Capsule; Refill: 0  - fluticasone (FLONASE) 50 MCG/ACT nasal spray; Administer 1 Spray into affected nostril(S) every day.  Dispense: 16 g; Refill: 0  - azithromycin (ZITHROMAX) 250 MG Tab; Take 2 tablets by mouth on day one. Take one tablet by mouth the remaining days until gone  Dispense: 6 Tablet; Refill: 0  - albuterol 108 (90 Base) MCG/ACT Aero Soln inhalation aerosol; Inhale 2 Puffs every 6 hours as needed for Shortness of Breath.  Dispense: 8.5 g; Refill: 0    54-year-old female presents with concern for persistent cough.  Vital signs stable, afebrile.  She is in no acute distress does appear mildly ill.  She has a hoarse nasally voice, bulging tympanic membranes, and obvious postnasal drip demonstrated by throat clearing and sniffing.  Her lungs are clear to auscultation, heart regular rhythm rate is 100.  I suspect she began with a viral upper respiratory infection, and has had persistent mucus production with postnasal drip leading into upper respiratory bacterial infection.  Symptom management medication sent to pharmacy along with azithromycin.  Recommend albuterol MDI to help with coughing episodes.  Red flags, RTC and ER precautions discussed, with patient confirming their understanding of  need for immediate follow-up.  Discussed medication management options, risks and benefits, and alternatives to treatment plan  agreed upon. Instructed to continue  medications without changes as ordered by primary care unless aforementioned above.  Patient expresses understanding and agrees to plan of care. All questions or concerns answered. For my MDM, I have personally reviewed previous notes, and test results as pertinent to today's visit.    Please note that this dictation was created using voice recognition software. I have made every reasonable attempt to correct obvious errors,  but there may be grammar errors, and possibly content that I did not discover before finalizing the note.   This note was electronically signed by MARIE Ashraf

## 2025-07-09 ENCOUNTER — HOSPITAL ENCOUNTER (OUTPATIENT)
Dept: CARDIOLOGY | Facility: MEDICAL CENTER | Age: 55
End: 2025-07-09
Attending: PHYSICIAN ASSISTANT
Payer: COMMERCIAL

## 2025-07-09 DIAGNOSIS — R01.1 CARDIAC MURMUR: ICD-10-CM

## 2025-07-09 LAB
LV EJECT FRACT MOD 2C 99903: 68.89
LV EJECT FRACT MOD 4C 99902: 68.2
LV EJECT FRACT MOD BP 99901: 68.23

## 2025-07-09 PROCEDURE — 93306 TTE W/DOPPLER COMPLETE: CPT | Mod: 26 | Performed by: STUDENT IN AN ORGANIZED HEALTH CARE EDUCATION/TRAINING PROGRAM

## 2025-07-09 PROCEDURE — 93306 TTE W/DOPPLER COMPLETE: CPT

## (undated) DEVICE — NEEDLE INSFL 120MM 14GA VRRS - (20/BX)

## (undated) DEVICE — SUTURE 2-0 VICRYL PLUS SH - 27 INCH (36/BX)

## (undated) DEVICE — Device

## (undated) DEVICE — SUTURE GENERAL

## (undated) DEVICE — OBTURATOR BLADELESS STANDARD 8MM (6EA/BX)

## (undated) DEVICE — MASK ANESTHESIA ADULT  - (100/CA)

## (undated) DEVICE — COVER TIP ENDOWRIST HOT SHEAR - (10EA/BX) DA VINCI

## (undated) DEVICE — SODIUM CHL IRRIGATION 0.9% 1000ML (12EA/CA)

## (undated) DEVICE — ARMREST CRADLE FOAM - (2PR/PK 12PR/CA)

## (undated) DEVICE — DRAIN JACKSON PRATT 15FR - (10EA/CA)

## (undated) DEVICE — ROBOTIC SURGERY SERVICES

## (undated) DEVICE — DERMABOND ADVANCED - (12EA/BX)

## (undated) DEVICE — PEN SKIN MARKER W/RULER - (50EA/BX)

## (undated) DEVICE — DRAPE LARGE 3 QUARTER - (20/CA)

## (undated) DEVICE — ELECTRODE 850 FOAM ADHESIVE - HYDROGEL RADIOTRNSPRNT (50/PK)

## (undated) DEVICE — FOLEY SLIPPERY 5CC 16 FR LF ALL SILICONE (12EA/CA)

## (undated) DEVICE — SYRINGE 50ML CATHETER TIP (40EA/BX 4BX/CA)

## (undated) DEVICE — SPONGE DRAIN 4 X 4IN 6-PLY - (2/PK25PK/BX12BX/CS)

## (undated) DEVICE — GLOVE BIOGEL SZ 7.5 SURGICAL PF LTX - (50PR/BX 4BX/CA)

## (undated) DEVICE — SCISSORS POTTS DA VINCI 10X'S REUSABLE

## (undated) DEVICE — LACTATED RINGERS INJ 1000 ML - (14EA/CA 60CA/PF)

## (undated) DEVICE — CLIP HEMOLOCK PURPLE - (14/BX)

## (undated) DEVICE — SYSTEM CLEARIFY VISUALIZATION (10EA/PK)

## (undated) DEVICE — TUBING CLEARLINK DUO-VENT - C-FLO (48EA/CA)

## (undated) DEVICE — WIRE GUIDE SENSOR DUAL FLEX - 5/BX

## (undated) DEVICE — SET LEADWIRE 5 LEAD BEDSIDE DISPOSABLE ECG (1SET OF 5/EA)

## (undated) DEVICE — AIRLESS LAP SPRAY TIP VISTASEAL (3EA/BX)

## (undated) DEVICE — PROTECTOR ULNA NERVE - (36PR/CA)

## (undated) DEVICE — SYRINGE DISP. 12 CC LL - (100/BX)

## (undated) DEVICE — DRAPE COLUMN  BOX OF 20

## (undated) DEVICE — SET SUCTION/IRRIGATION WITH DISPOSABLE TIP (6/CA )PART #0250-070-520 IS A SUB

## (undated) DEVICE — SET EXTENSION WITH 2 PORTS (48EA/CA) ***PART #2C8610 IS A SUBSTITUTE*****

## (undated) DEVICE — CANISTER SUCTION 3000ML MECHANICAL FILTER AUTO SHUTOFF MEDI-VAC NONSTERILE LF DISP  (40EA/CA)

## (undated) DEVICE — TOWEL STOP TIMEOUT SAFETY FLAG (40EA/CA)

## (undated) DEVICE — GOWN WARMING STANDARD FLEX - (30/CA)

## (undated) DEVICE — KIT ANESTHESIA W/CIRCUIT & 3/LT BAG W/FILTER (20EA/CA)

## (undated) DEVICE — HEAD HOLDER JUNIOR/ADULT

## (undated) DEVICE — NEPTUNE 4 PORT MANIFOLD - (20/PK)

## (undated) DEVICE — BLADE SURGICAL #15 - (50/BX 3BX/CA)

## (undated) DEVICE — TROCAR 5X100 NON BLADED Z-TH - READ KII (6/BX)

## (undated) DEVICE — SLEEVE VASO CALF MED - (10PR/CA)

## (undated) DEVICE — SUTURE 2-0 ETHILON FS - (36/BX) 18 INCH

## (undated) DEVICE — SENSOR OXIMETER ADULT SPO2 RD SET (20EA/BX)

## (undated) DEVICE — SEAL 5MM-8MM UNIVERSAL  BOX OF 10

## (undated) DEVICE — DRAPE ARM  BOX OF 20

## (undated) DEVICE — GLOVE SZ 6.5 BIOGEL PI MICRO - PF LF (50PR/BX)

## (undated) DEVICE — GLOVE BIOGEL PI INDICATOR SZ 6.5 SURGICAL PF LF - (50/BX 4BX/CA)

## (undated) DEVICE — SUCTION INSTRUMENT YANKAUER BULBOUS TIP W/O VENT (50EA/CA)

## (undated) DEVICE — ELECTRODE DUAL RETURN W/ CORD - (50/PK)

## (undated) DEVICE — PAD OR TABLE DA VINCI 2IN X 20IN X 72IN - (12EA/CA)

## (undated) DEVICE — SUTURE 4-0 VICRYL PLUS PC-3 18 (12PK/BX)"

## (undated) DEVICE — SCISSORS 5MM CVD (6EA/BX)

## (undated) DEVICE — SUTURE 4-0 MONOCRYL PLUS PS-2 - 27 INCH (36/BX)

## (undated) DEVICE — SUTURE 0 VICRYL PLUS UR-6 - 27 INCH (36/BX)

## (undated) DEVICE — SEALANT TISSUE CLOSURE KIT FIBIRIN VISTASEAL 10ML (1EA/BX)